# Patient Record
Sex: FEMALE | Race: WHITE | NOT HISPANIC OR LATINO
[De-identification: names, ages, dates, MRNs, and addresses within clinical notes are randomized per-mention and may not be internally consistent; named-entity substitution may affect disease eponyms.]

---

## 2021-04-08 ENCOUNTER — NON-APPOINTMENT (OUTPATIENT)
Age: 76
End: 2021-04-08

## 2021-04-23 DIAGNOSIS — R07.9 CHEST PAIN, UNSPECIFIED: ICD-10-CM

## 2021-04-29 DIAGNOSIS — I10 ESSENTIAL (PRIMARY) HYPERTENSION: ICD-10-CM

## 2021-05-06 ENCOUNTER — APPOINTMENT (OUTPATIENT)
Dept: HEART AND VASCULAR | Facility: CLINIC | Age: 76
End: 2021-05-06

## 2021-09-30 ENCOUNTER — APPOINTMENT (OUTPATIENT)
Dept: HEART AND VASCULAR | Facility: CLINIC | Age: 76
End: 2021-09-30

## 2021-11-05 ENCOUNTER — APPOINTMENT (OUTPATIENT)
Dept: HEART AND VASCULAR | Facility: CLINIC | Age: 76
End: 2021-11-05

## 2021-11-12 DIAGNOSIS — R09.89 OTHER SPECIFIED SYMPTOMS AND SIGNS INVOLVING THE CIRCULATORY AND RESPIRATORY SYSTEMS: ICD-10-CM

## 2021-11-12 DIAGNOSIS — I25.9 CHRONIC ISCHEMIC HEART DISEASE, UNSPECIFIED: ICD-10-CM

## 2021-11-15 ENCOUNTER — APPOINTMENT (OUTPATIENT)
Dept: HEART AND VASCULAR | Facility: CLINIC | Age: 76
End: 2021-11-15

## 2022-05-20 ENCOUNTER — INPATIENT (INPATIENT)
Facility: HOSPITAL | Age: 77
LOS: 0 days | Discharge: HOME | DRG: 305 | End: 2022-05-21
Attending: INTERNAL MEDICINE | Admitting: INTERNAL MEDICINE
Payer: MEDICARE

## 2022-05-20 VITALS
RESPIRATION RATE: 16 BRPM | WEIGHT: 139.99 LBS | OXYGEN SATURATION: 96 % | HEIGHT: 63 IN | TEMPERATURE: 98 F | HEART RATE: 70 BPM | DIASTOLIC BLOOD PRESSURE: 95 MMHG | SYSTOLIC BLOOD PRESSURE: 166 MMHG

## 2022-05-20 DIAGNOSIS — Z98.89 OTHER SPECIFIED POSTPROCEDURAL STATES: Chronic | ICD-10-CM

## 2022-05-20 DIAGNOSIS — R73.9 HYPERGLYCEMIA, UNSPECIFIED: ICD-10-CM

## 2022-05-20 DIAGNOSIS — E03.9 HYPOTHYROIDISM, UNSPECIFIED: ICD-10-CM

## 2022-05-20 DIAGNOSIS — K37 UNSPECIFIED APPENDICITIS: Chronic | ICD-10-CM

## 2022-05-20 DIAGNOSIS — Z86.2 PERSONAL HISTORY OF DISEASES OF THE BLOOD AND BLOOD-FORMING ORGANS AND CERTAIN DISORDERS INVOLVING THE IMMUNE MECHANISM: ICD-10-CM

## 2022-05-20 DIAGNOSIS — R06.00 DYSPNEA, UNSPECIFIED: ICD-10-CM

## 2022-05-20 DIAGNOSIS — I10 ESSENTIAL (PRIMARY) HYPERTENSION: ICD-10-CM

## 2022-05-20 DIAGNOSIS — D72.829 ELEVATED WHITE BLOOD CELL COUNT, UNSPECIFIED: ICD-10-CM

## 2022-05-20 LAB
ALBUMIN SERPL ELPH-MCNC: 4.1 G/DL — SIGNIFICANT CHANGE UP (ref 3.3–5)
ALP SERPL-CCNC: 132 U/L — HIGH (ref 40–120)
ALT FLD-CCNC: 41 U/L — SIGNIFICANT CHANGE UP (ref 10–45)
ANION GAP SERPL CALC-SCNC: 12 MMOL/L — SIGNIFICANT CHANGE UP (ref 5–17)
APPEARANCE UR: CLEAR — SIGNIFICANT CHANGE UP
APTT BLD: 24.5 SEC — LOW (ref 27.5–35.5)
AST SERPL-CCNC: 23 U/L — SIGNIFICANT CHANGE UP (ref 10–40)
BASOPHILS # BLD AUTO: 0 K/UL — SIGNIFICANT CHANGE UP (ref 0–0.2)
BASOPHILS NFR BLD AUTO: 0 % — SIGNIFICANT CHANGE UP (ref 0–2)
BILIRUB SERPL-MCNC: 0.6 MG/DL — SIGNIFICANT CHANGE UP (ref 0.2–1.2)
BILIRUB UR-MCNC: NEGATIVE — SIGNIFICANT CHANGE UP
BUN SERPL-MCNC: 24 MG/DL — HIGH (ref 7–23)
CALCIUM SERPL-MCNC: 9.8 MG/DL — SIGNIFICANT CHANGE UP (ref 8.4–10.5)
CHLORIDE SERPL-SCNC: 103 MMOL/L — SIGNIFICANT CHANGE UP (ref 96–108)
CO2 SERPL-SCNC: 25 MMOL/L — SIGNIFICANT CHANGE UP (ref 22–31)
COLOR SPEC: YELLOW — SIGNIFICANT CHANGE UP
CREAT SERPL-MCNC: 0.83 MG/DL — SIGNIFICANT CHANGE UP (ref 0.5–1.3)
DIFF PNL FLD: NEGATIVE — SIGNIFICANT CHANGE UP
EGFR: 73 ML/MIN/1.73M2 — SIGNIFICANT CHANGE UP
EOSINOPHIL # BLD AUTO: 0 K/UL — SIGNIFICANT CHANGE UP (ref 0–0.5)
EOSINOPHIL NFR BLD AUTO: 0 % — SIGNIFICANT CHANGE UP (ref 0–6)
GLUCOSE BLDC GLUCOMTR-MCNC: 289 MG/DL — HIGH (ref 70–99)
GLUCOSE SERPL-MCNC: 320 MG/DL — HIGH (ref 70–99)
GLUCOSE UR QL: >=1000
HCT VFR BLD CALC: 42.6 % — SIGNIFICANT CHANGE UP (ref 34.5–45)
HGB BLD-MCNC: 14.3 G/DL — SIGNIFICANT CHANGE UP (ref 11.5–15.5)
INR BLD: 0.9 — SIGNIFICANT CHANGE UP (ref 0.88–1.16)
KETONES UR-MCNC: 15 MG/DL
LEUKOCYTE ESTERASE UR-ACNC: ABNORMAL
LYMPHOCYTES # BLD AUTO: 17 % — SIGNIFICANT CHANGE UP (ref 13–44)
LYMPHOCYTES # BLD AUTO: 4.21 K/UL — HIGH (ref 1–3.3)
MCHC RBC-ENTMCNC: 31.8 PG — SIGNIFICANT CHANGE UP (ref 27–34)
MCHC RBC-ENTMCNC: 33.6 GM/DL — SIGNIFICANT CHANGE UP (ref 32–36)
MCV RBC AUTO: 94.9 FL — SIGNIFICANT CHANGE UP (ref 80–100)
MONOCYTES # BLD AUTO: 0.45 K/UL — SIGNIFICANT CHANGE UP (ref 0–0.9)
MONOCYTES NFR BLD AUTO: 1.8 % — LOW (ref 2–14)
NEUTROPHILS # BLD AUTO: 20.09 K/UL — HIGH (ref 1.8–7.4)
NEUTROPHILS NFR BLD AUTO: 81.2 % — HIGH (ref 43–77)
NITRITE UR-MCNC: NEGATIVE — SIGNIFICANT CHANGE UP
PH UR: 6 — SIGNIFICANT CHANGE UP (ref 5–8)
PLATELET # BLD AUTO: 280 K/UL — SIGNIFICANT CHANGE UP (ref 150–400)
POTASSIUM SERPL-MCNC: 5.3 MMOL/L — SIGNIFICANT CHANGE UP (ref 3.5–5.3)
POTASSIUM SERPL-SCNC: 5.3 MMOL/L — SIGNIFICANT CHANGE UP (ref 3.5–5.3)
PROT SERPL-MCNC: 6.6 G/DL — SIGNIFICANT CHANGE UP (ref 6–8.3)
PROT UR-MCNC: ABNORMAL MG/DL
PROTHROM AB SERPL-ACNC: 10.7 SEC — SIGNIFICANT CHANGE UP (ref 10.5–13.4)
RBC # BLD: 4.49 M/UL — SIGNIFICANT CHANGE UP (ref 3.8–5.2)
RBC # FLD: 13.8 % — SIGNIFICANT CHANGE UP (ref 10.3–14.5)
SARS-COV-2 RNA SPEC QL NAA+PROBE: NEGATIVE — SIGNIFICANT CHANGE UP
SODIUM SERPL-SCNC: 140 MMOL/L — SIGNIFICANT CHANGE UP (ref 135–145)
SP GR SPEC: >=1.03 — SIGNIFICANT CHANGE UP (ref 1–1.03)
TROPONIN T SERPL-MCNC: 0.01 NG/ML — SIGNIFICANT CHANGE UP (ref 0–0.01)
UROBILINOGEN FLD QL: 0.2 E.U./DL — SIGNIFICANT CHANGE UP
WBC # BLD: 24.74 K/UL — HIGH (ref 3.8–10.5)
WBC # FLD AUTO: 24.74 K/UL — HIGH (ref 3.8–10.5)

## 2022-05-20 PROCEDURE — 93010 ELECTROCARDIOGRAM REPORT: CPT

## 2022-05-20 PROCEDURE — 99285 EMERGENCY DEPT VISIT HI MDM: CPT

## 2022-05-20 PROCEDURE — 71045 X-RAY EXAM CHEST 1 VIEW: CPT | Mod: 26

## 2022-05-20 RX ORDER — SODIUM CHLORIDE 9 MG/ML
1000 INJECTION INTRAMUSCULAR; INTRAVENOUS; SUBCUTANEOUS
Refills: 0 | Status: DISCONTINUED | OUTPATIENT
Start: 2022-05-20 | End: 2022-05-20

## 2022-05-20 RX ORDER — METOPROLOL TARTRATE 50 MG
25 TABLET ORAL DAILY
Refills: 0 | Status: DISCONTINUED | OUTPATIENT
Start: 2022-05-21 | End: 2022-05-21

## 2022-05-20 RX ORDER — VERAPAMIL HCL 240 MG
180 CAPSULE, EXTENDED RELEASE PELLETS 24 HR ORAL EVERY 24 HOURS
Refills: 0 | Status: DISCONTINUED | OUTPATIENT
Start: 2022-05-20 | End: 2022-05-21

## 2022-05-20 RX ORDER — VERAPAMIL HCL 240 MG
180 CAPSULE, EXTENDED RELEASE PELLETS 24 HR ORAL DAILY
Refills: 0 | Status: DISCONTINUED | OUTPATIENT
Start: 2022-05-20 | End: 2022-05-20

## 2022-05-20 RX ORDER — HEPARIN SODIUM 5000 [USP'U]/ML
5000 INJECTION INTRAVENOUS; SUBCUTANEOUS EVERY 12 HOURS
Refills: 0 | Status: DISCONTINUED | OUTPATIENT
Start: 2022-05-20 | End: 2022-05-21

## 2022-05-20 RX ORDER — ATORVASTATIN CALCIUM 80 MG/1
80 TABLET, FILM COATED ORAL AT BEDTIME
Refills: 0 | Status: DISCONTINUED | OUTPATIENT
Start: 2022-05-20 | End: 2022-05-21

## 2022-05-20 RX ORDER — ASPIRIN/CALCIUM CARB/MAGNESIUM 324 MG
81 TABLET ORAL DAILY
Refills: 0 | Status: DISCONTINUED | OUTPATIENT
Start: 2022-05-20 | End: 2022-05-21

## 2022-05-20 RX ORDER — INSULIN HUMAN 100 [IU]/ML
5 INJECTION, SOLUTION SUBCUTANEOUS ONCE
Refills: 0 | Status: COMPLETED | OUTPATIENT
Start: 2022-05-20 | End: 2022-05-20

## 2022-05-20 RX ADMIN — ATORVASTATIN CALCIUM 80 MILLIGRAM(S): 80 TABLET, FILM COATED ORAL at 22:57

## 2022-05-20 RX ADMIN — SODIUM CHLORIDE 100 MILLILITER(S): 9 INJECTION INTRAMUSCULAR; INTRAVENOUS; SUBCUTANEOUS at 19:47

## 2022-05-20 RX ADMIN — HEPARIN SODIUM 5000 UNIT(S): 5000 INJECTION INTRAVENOUS; SUBCUTANEOUS at 22:56

## 2022-05-20 NOTE — H&P ADULT - PROBLEM SELECTOR PLAN 3
--WBC 24 on admit.   --UA (-); AFebrile; Lung CTA; No infectious symptoms at this time.   --Could be 2/2 daily Methylprednisolone.   --Continue to monitor.

## 2022-05-20 NOTE — ED ADULT NURSE NOTE - NSIMPLEMENTINTERV_GEN_ALL_ED
Implemented All Universal Safety Interventions:  [place] to call system. Call bell, personal items and telephone within reach. Instruct patient to call for assistance. Room bathroom lighting operational. Non-slip footwear when patient is off stretcher. Physically safe environment: no spills, clutter or unnecessary equipment. Stretcher in lowest position, wheels locked, appropriate side rails in place.

## 2022-05-20 NOTE — ED PROVIDER NOTE - OBJECTIVE STATEMENT
76 y/o F with a pmhx of HTN, CAD, S/P stents x3 2016, HLD , HT, presents to the ED c/o BROWN starting today. Reports she was barely able to cross the street when the BROWN had started. Denies any CP, dizziness or numbness. Pt was seen by her cardiologist Dr. Marshall Hsieh and found to be hypertensive with a diastolic of 116 bp. Pt refereed to ED for hypertensive urgency and to be admitted for BP control. 78 y/o F with a pmhx of HTN, CAD, S/P stents x3 in 2016, HLD , arthritis on methylprednisone, presents to the ED c/o BROWN starting yesterday. Reports she was barely able to cross the street when dyspnea began. Denies any CP, dizziness, palpitations, syncope. Pt was seen by her cardiologist Dr. Marshall Hsieh today and found to be hypertensive with a diastolic of 116 mm hg. Pt referred to ED for hypertensive urgency.

## 2022-05-20 NOTE — ED ADULT NURSE NOTE - CHPI ED NUR SYMPTOMS NEG
no back pain/no chest pain/no congestion/no diaphoresis/no dizziness/no fever/no nausea/no shortness of breath/no syncope/no vomiting

## 2022-05-20 NOTE — H&P ADULT - HISTORY OF PRESENT ILLNESS
Pt is 76yo Female w/ PMHx of HTN, CAD (s/p PCI x3 in 2016), HT______ who presented to [facility] ED on 5/20/22 sent from cardiologist office (Dr. Hsieh) for hypertensive urgency. In office, patient w/ dyspnea on exertion and was found to have a Diastolic BP of 115-120 (unknown was SBP reading was). Pt referred to ED at that time for BP medication regimen optimization. At this time patient comfortable. Pt denies CP, SOB at rest, abdominal pain, N/V, palpitations, dizziness/syncope, LE edema, orthopnea, PND.     VITALS: HR 60-70s, BP 160s/90s, SpO2 96% on RA, RR 16, T 98.4F. LABS: WBC 24.74, Hgb/Hct 14.3/42.6, Plt Cnt 280, Trop T 0.01, , Blood Glucose 320.     TREATMENT IN ED: Humulin 5units SQ x1, NS 1000cc IV bolus.     Patient admitted to cardiology for further optimization of blood pressure medication regimen.  Pt is 76yo Female w/ PMHx of HTN, CAD (s/p PCI mLAD and PCI D1 in 2016), hypothyroid (was previously on synthroid, no longer taking), autoimmune disorder ?? (pt is on methylprednisolone and followed w/ rheumatology; unsure what disease she has) who presented to [facility] ED on 5/20/22 sent from cardiologist office (Dr. Hsieh) for hypertensive urgency. In office, patient w/ dyspnea on exertion and was found to have a Diastolic BP of 115-120 (unknown was SBP reading was). Pt referred to ED at that time for BP medication regimen optimization. Pt reporting SEVERE reduction in exercise tolerance, typically walks miles; however, over the past 4 days she gets very short of breath with ambulation of ~1 city block. Patient states this is the aAt this time patient comfortable. Pt denies CP, SOB at rest, abdominal pain, N/V, palpitations, dizziness/syncope, LE edema, orthopnea, PND.     VITALS: HR 60-70s, BP 160s/90s, SpO2 96% on RA, RR 16, T 98.4F. LABS: WBC 24.74, Hgb/Hct 14.3/42.6, Plt Cnt 280, Trop T 0.01, , Blood Glucose 320.     TREATMENT IN ED: Humulin 5units SQ x1, NS 1000cc IV bolus.     Patient admitted to cardiology for further optimization of blood pressure medication regimen.

## 2022-05-20 NOTE — H&P ADULT - NSICDXPASTMEDICALHX_GEN_ALL_CORE_FT
PAST MEDICAL HISTORY:  Coronary artery disease involving native coronary artery of native heart without angina pectoris     HTN (hypertension)     Hyperlipidemia     Hypothyroid     Stented coronary artery

## 2022-05-20 NOTE — H&P ADULT - NSHPLABSRESULTS_GEN_ALL_CORE
14.3   24.74 )-----------( 280      ( 20 May 2022 18:04 )             42.6     140  |  103  |  24<H>  ----------------------------<  320<H>  5.3   |  25  |  0.83    Ca    9.8      20 May 2022 18:04    TPro  6.6  /  Alb  4.1  /  TBili  0.6  /  DBili  x   /  AST  23  /  ALT  41  /  AlkPhos  132<H>  05-20    PT/INR - ( 20 May 2022 18:04 )   PT: 10.7 sec;   INR: 0.90        PTT - ( 20 May 2022 18:04 )  PTT:24.5 sec    CARDIAC MARKERS ( 20 May 2022 18:04 )  x     / 0.01 ng/mL / x     / x     / x        Urinalysis Basic - ( 20 May 2022 18:21 )    Color: Yellow / Appearance: Clear / SG: >=1.030 / pH: x  Gluc: x / Ketone: 15 mg/dL  / Bili: Negative / Urobili: 0.2 E.U./dL   Blood: x / Protein: Trace mg/dL / Nitrite: NEGATIVE   Leuk Esterase: Trace / RBC: < 5 /HPF / WBC > 10 /HPF   Sq Epi: x / Non Sq Epi: Many /HPF / Bacteria: Present /HPF

## 2022-05-20 NOTE — H&P ADULT - PROBLEM SELECTOR PLAN 1
--Sent in by Dr. Hsieh for HTN (-120 in office; unknown SBP).   --On arrival BP 160s/90s.   --CONT: Verapamil 180mg PO QD, Toprol 25mg PO QD.   --PLAN: monitor pressure overnight, and expand/alter BP regimen as clinically indicated.

## 2022-05-20 NOTE — ED ADULT TRIAGE NOTE - CHIEF COMPLAINT QUOTE
Pt has hx of 3 cardiac stents, reports BROWN x 4 days. Seen by cardiologist today and had ECHO, found to be hypertensive at 155/115 in office. Sent to ED for abnormal ECHO showing "wall motion abnormalities," and for HTN urgency. Denies headache, dizziness, chest pain, sob at this time.

## 2022-05-20 NOTE — H&P ADULT - PROBLEM SELECTOR PLAN 5
-- on admission.   --Pt denies history of diabetes.   --Daily Methylprednisolone use may contribute.   --Pt refused insulin at this time given she has never taken insulin and denies Hx of diabetes.   --F/U A1c in morning labs and initiate insulin if pt diabetic.

## 2022-05-20 NOTE — H&P ADULT - PROBLEM SELECTOR PLAN 6
--Pt on Methylprednisolone 4mg PO QD, and is unsure why (states she has been on this for 10yrs).   --Follows w/ Dr. Alexander Feliciano (rheumatologist) - attempt to obtain collateral on patient's autoimmune disorder history.         DVT ppx: Hep SQ  Dispo: pending further eval

## 2022-05-20 NOTE — H&P ADULT - CONSTITUTIONAL
detailed exam
I have re-evaluated the patient's fluid status and reviewed vital signs. Clinical perfusion assessment was performed.

## 2022-05-20 NOTE — ED ADULT NURSE NOTE - OBJECTIVE STATEMENT
Pt was sent to ED for abnormal ECHO showing "wall motion abnormalities," and for HTN urgency. AOX4. VSS.  Patient denies chest pain, discomfort, shortness of breath, difficulty breathing and any form of distress not noted. Patient oriented to ED area. All needs attended. Pt on cardiac monitor. Rounding in progress. Fall risk precautions maintained. Pt was sent to ED for abnormal ECHO showing "wall motion abnormalities," and for HTN urgency. AOX4. VSS.  Patient denies chest pain, discomfort, shortness of breath, difficulty breathing and any form of distress not noted at the moment. States: "I only feel the symptoms when I walk or trying to do something". Patient oriented to ED area. All needs attended. Pt on cardiac monitor. Rounding in progress. Fall risk precautions maintained.

## 2022-05-20 NOTE — H&P ADULT - PROBLEM SELECTOR PLAN 2
--Pt w/ significant BROWN w/ ambulation of < 1 block x4 days (reports she is typically a long distance walker).   --Pt reports symptoms are similar to her previous cardiac cath w/ PCI in 2016.   --Cardiac Cath (3/2016): PCI mLAD, PCI D1.   --Denies any current symptoms; Denies any episodes of CP/dizziness/syncope over the past few months.   --PLAN: TTE ordered for structural eval; consider ischemic eval this admit given presentation similar to previous admission.

## 2022-05-20 NOTE — H&P ADULT - NSICDXPASTSURGICALHX_GEN_ALL_CORE_FT
PAST SURGICAL HISTORY:  Appendicitis When pt was 26 yo    History of cardiac cath 3/3/16@[facility]: 85% mid LAD FFR 0.76; mid D1 diffuse 90%; LCx luminal irregularities; RCA dominant with luminal irregularities; EF 55% EDP = 15mmHg. Successful Rotoblator assisted PTCA Promus ARIES placement of mid LAD and D1

## 2022-05-20 NOTE — ED PROVIDER NOTE - CLINICAL SUMMARY MEDICAL DECISION MAKING FREE TEXT BOX
Impression: Pt with a hx of HTN, HLD, CAD, s/p stents refereed to ED for BROWN, uncontrolled hypertension. BP stable 166/95, vitals otherwise stable in ED. Labs noted to have leukocytosis 24.74 secondary to chronic methylprednisolone usage, trop negative, remainder of CMP pending. Will contact Dr. Hsieh to discuss case. CXR clear with no fluids, no infiltrates, no CHF. Will admit to cardiac tele. Impression: Pt with a hx of HTN, HLD, CAD, s/p stents referred to ED for BROWN and uncontrolled hypertension. BP mildly elev at 166/95, vitals otherwise stable in ED. Labs notable for leukocytosis 24.74 and hyperglycemia to 300 with neg ag, likely secondary to chronic methylprednisolone usage. trop and bnp neg. CXR neg for effusions, infiltrates, CHF. Will admit to cardiac tele for further management of bp and work up for brown.

## 2022-05-20 NOTE — ED PROVIDER NOTE - NSICDXPASTSURGICALHX_GEN_ALL_CORE_FT
PAST SURGICAL HISTORY:  Appendicitis When pt was 26 yo    History of cardiac cath 3/3/16@[facility]: 85% mid LAD FFR 0.76; mid D1 diffuse 90%; LCx luminal irregularities; RCA dominant with luminal irregularities; EF 55% EDP = 15mmHg. Successful Rotoblator assisted PTCA Promus ARIES placement of mid LAD and D1

## 2022-05-20 NOTE — H&P ADULT - PROBLEM SELECTOR PLAN 4
--Hx of Hypothyroid, and per documentation patient previously on Levothyroxine 50mg PO QD.   --Pt not on Levothyroxine at this time.   --F/U TSH on morning labs.

## 2022-05-20 NOTE — H&P ADULT - ASSESSMENT
76yo F w/ PMHx of HTN, HLD, CAD (s/p PCI mLAD and PCI D1 in 2016), hypothyroid, possible autoimmune disorder (follows w/ rheumatologist and takes steroids daily) who was sent in from cardiologist office for HTN and BROWN. Pt BP 160s/90s on arrival (reporting -120). Pt sent for BP regimen optimization. Pt also w/ significant BROWN x4days (usually walks miles, now dyspneic w/ ambulation of < 1 city block). Reports these symptoms are similar to what she experienced prior to her PCI in 2016.

## 2022-05-20 NOTE — PATIENT PROFILE ADULT - FALL HARM RISK - HARM RISK INTERVENTIONS
Assistance with ambulation/Assistance OOB with selected safe patient handling equipment/Communicate Risk of Fall with Harm to all staff/Monitor gait and stability/Reinforce activity limits and safety measures with patient and family/Review medications for side effects contributing to fall risk/Sit up slowly, dangle for a short time, stand at bedside before walking/Tailored Fall Risk Interventions/Toileting schedule using arm’s reach rule for commode and bathroom/Use of alarms - bed, chair and/or voice tab/Visual Cue: Yellow wristband and red socks/Bed in lowest position, wheels locked, appropriate side rails in place/Call bell, personal items and telephone in reach/Instruct patient to call for assistance before getting out of bed or chair/Non-slip footwear when patient is out of bed/[place] to call system/Physically safe environment - no spills, clutter or unnecessary equipment/Purposeful Proactive Rounding/Room/bathroom lighting operational, light cord in reach

## 2022-05-21 ENCOUNTER — TRANSCRIPTION ENCOUNTER (OUTPATIENT)
Age: 77
End: 2022-05-21

## 2022-05-21 VITALS
HEART RATE: 64 BPM | DIASTOLIC BLOOD PRESSURE: 76 MMHG | SYSTOLIC BLOOD PRESSURE: 148 MMHG | RESPIRATION RATE: 17 BRPM | OXYGEN SATURATION: 95 %

## 2022-05-21 LAB
A1C WITH ESTIMATED AVERAGE GLUCOSE RESULT: 9.5 % — HIGH (ref 4–5.6)
ANION GAP SERPL CALC-SCNC: 8 MMOL/L — SIGNIFICANT CHANGE UP (ref 5–17)
BUN SERPL-MCNC: 22 MG/DL — SIGNIFICANT CHANGE UP (ref 7–23)
CALCIUM SERPL-MCNC: 9.2 MG/DL — SIGNIFICANT CHANGE UP (ref 8.4–10.5)
CHLORIDE SERPL-SCNC: 106 MMOL/L — SIGNIFICANT CHANGE UP (ref 96–108)
CHOLEST SERPL-MCNC: 173 MG/DL — SIGNIFICANT CHANGE UP
CO2 SERPL-SCNC: 24 MMOL/L — SIGNIFICANT CHANGE UP (ref 22–31)
CREAT SERPL-MCNC: 0.75 MG/DL — SIGNIFICANT CHANGE UP (ref 0.5–1.3)
EGFR: 83 ML/MIN/1.73M2 — SIGNIFICANT CHANGE UP
ESTIMATED AVERAGE GLUCOSE: 226 MG/DL — HIGH (ref 68–114)
GLUCOSE SERPL-MCNC: 277 MG/DL — HIGH (ref 70–99)
HCT VFR BLD CALC: 39.3 % — SIGNIFICANT CHANGE UP (ref 34.5–45)
HCV AB S/CO SERPL IA: 0.04 S/CO — SIGNIFICANT CHANGE UP
HCV AB SERPL-IMP: SIGNIFICANT CHANGE UP
HDLC SERPL-MCNC: 43 MG/DL — LOW
HGB BLD-MCNC: 13.2 G/DL — SIGNIFICANT CHANGE UP (ref 11.5–15.5)
LIPID PNL WITH DIRECT LDL SERPL: 86 MG/DL — SIGNIFICANT CHANGE UP
MAGNESIUM SERPL-MCNC: 2 MG/DL — SIGNIFICANT CHANGE UP (ref 1.6–2.6)
MCHC RBC-ENTMCNC: 32 PG — SIGNIFICANT CHANGE UP (ref 27–34)
MCHC RBC-ENTMCNC: 33.6 GM/DL — SIGNIFICANT CHANGE UP (ref 32–36)
MCV RBC AUTO: 95.2 FL — SIGNIFICANT CHANGE UP (ref 80–100)
NON HDL CHOLESTEROL: 130 MG/DL — HIGH
NRBC # BLD: 0 /100 WBCS — SIGNIFICANT CHANGE UP (ref 0–0)
PLATELET # BLD AUTO: 236 K/UL — SIGNIFICANT CHANGE UP (ref 150–400)
POTASSIUM SERPL-MCNC: 4.2 MMOL/L — SIGNIFICANT CHANGE UP (ref 3.5–5.3)
POTASSIUM SERPL-SCNC: 4.2 MMOL/L — SIGNIFICANT CHANGE UP (ref 3.5–5.3)
RBC # BLD: 4.13 M/UL — SIGNIFICANT CHANGE UP (ref 3.8–5.2)
RBC # FLD: 13.9 % — SIGNIFICANT CHANGE UP (ref 10.3–14.5)
SODIUM SERPL-SCNC: 138 MMOL/L — SIGNIFICANT CHANGE UP (ref 135–145)
TRIGL SERPL-MCNC: 221 MG/DL — HIGH
TROPONIN T SERPL-MCNC: <0.01 NG/ML — SIGNIFICANT CHANGE UP (ref 0–0.01)
TSH SERPL-MCNC: 1.3 UIU/ML — SIGNIFICANT CHANGE UP (ref 0.27–4.2)
WBC # BLD: 20.23 K/UL — HIGH (ref 3.8–10.5)
WBC # FLD AUTO: 20.23 K/UL — HIGH (ref 3.8–10.5)

## 2022-05-21 PROCEDURE — 80053 COMPREHEN METABOLIC PANEL: CPT

## 2022-05-21 PROCEDURE — 80061 LIPID PANEL: CPT

## 2022-05-21 PROCEDURE — 85730 THROMBOPLASTIN TIME PARTIAL: CPT

## 2022-05-21 PROCEDURE — 85027 COMPLETE CBC AUTOMATED: CPT

## 2022-05-21 PROCEDURE — 85610 PROTHROMBIN TIME: CPT

## 2022-05-21 PROCEDURE — 84443 ASSAY THYROID STIM HORMONE: CPT

## 2022-05-21 PROCEDURE — 84484 ASSAY OF TROPONIN QUANT: CPT

## 2022-05-21 PROCEDURE — 93005 ELECTROCARDIOGRAM TRACING: CPT

## 2022-05-21 PROCEDURE — 86803 HEPATITIS C AB TEST: CPT

## 2022-05-21 PROCEDURE — 82962 GLUCOSE BLOOD TEST: CPT

## 2022-05-21 PROCEDURE — 85025 COMPLETE CBC W/AUTO DIFF WBC: CPT

## 2022-05-21 PROCEDURE — 80048 BASIC METABOLIC PNL TOTAL CA: CPT

## 2022-05-21 PROCEDURE — 83735 ASSAY OF MAGNESIUM: CPT

## 2022-05-21 PROCEDURE — 36415 COLL VENOUS BLD VENIPUNCTURE: CPT

## 2022-05-21 PROCEDURE — 83036 HEMOGLOBIN GLYCOSYLATED A1C: CPT

## 2022-05-21 PROCEDURE — 99285 EMERGENCY DEPT VISIT HI MDM: CPT

## 2022-05-21 PROCEDURE — 87086 URINE CULTURE/COLONY COUNT: CPT

## 2022-05-21 PROCEDURE — 71045 X-RAY EXAM CHEST 1 VIEW: CPT

## 2022-05-21 PROCEDURE — 83880 ASSAY OF NATRIURETIC PEPTIDE: CPT

## 2022-05-21 PROCEDURE — 81001 URINALYSIS AUTO W/SCOPE: CPT

## 2022-05-21 PROCEDURE — 87635 SARS-COV-2 COVID-19 AMP PRB: CPT

## 2022-05-21 RX ORDER — ISOSORBIDE MONONITRATE 60 MG/1
60 TABLET, EXTENDED RELEASE ORAL DAILY
Refills: 0 | Status: DISCONTINUED | OUTPATIENT
Start: 2022-05-21 | End: 2022-05-21

## 2022-05-21 RX ORDER — ASPIRIN/CALCIUM CARB/MAGNESIUM 324 MG
1 TABLET ORAL
Qty: 30 | Refills: 3
Start: 2022-05-21 | End: 2022-09-17

## 2022-05-21 RX ORDER — ISOSORBIDE MONONITRATE 60 MG/1
1 TABLET, EXTENDED RELEASE ORAL
Qty: 30 | Refills: 3
Start: 2022-05-21 | End: 2022-09-17

## 2022-05-21 RX ORDER — LACTOBACILLUS ACIDOPHILUS 100MM CELL
1 CAPSULE ORAL DAILY
Refills: 0 | Status: DISCONTINUED | OUTPATIENT
Start: 2022-05-21 | End: 2022-05-21

## 2022-05-21 RX ADMIN — Medication 4 MILLIGRAM(S): at 05:40

## 2022-05-21 RX ADMIN — Medication 81 MILLIGRAM(S): at 11:49

## 2022-05-21 RX ADMIN — ISOSORBIDE MONONITRATE 60 MILLIGRAM(S): 60 TABLET, EXTENDED RELEASE ORAL at 11:49

## 2022-05-21 RX ADMIN — Medication 1 TABLET(S): at 14:09

## 2022-05-21 RX ADMIN — HEPARIN SODIUM 5000 UNIT(S): 5000 INJECTION INTRAVENOUS; SUBCUTANEOUS at 05:40

## 2022-05-21 RX ADMIN — Medication 25 MILLIGRAM(S): at 05:38

## 2022-05-21 RX ADMIN — Medication 180 MILLIGRAM(S): at 05:38

## 2022-05-21 NOTE — DISCHARGE NOTE PROVIDER - NSDCMRMEDTOKEN_GEN_ALL_CORE_FT
Lipitor 80 mg oral tablet: 1 tab(s) orally once a day  methylPREDNISolone 4 mg oral tablet: 1 tab(s) orally once a day  Toprol-XL 25 mg oral tablet, extended release: 1 tab(s) orally once a day  Trintellix 5 mg oral tablet: 1 tab(s) orally once a day  verapamil 180 mg/24 hours oral capsule, extended release: 1 cap(s) orally once a day   aspirin 81 mg oral delayed release tablet: 1 tab(s) orally once a day  isosorbide mononitrate 60 mg oral tablet, extended release: 1 tab(s) orally once a day  Lipitor 80 mg oral tablet: 1 tab(s) orally once a day  methylPREDNISolone 4 mg oral tablet: 1 tab(s) orally once a day  sulfamethoxazole-trimethoprim 800 mg-160 mg oral tablet: 1 tab(s) orally 2 times a day. Final dose should be evening of 5/23  Toprol-XL 25 mg oral tablet, extended release: 1 tab(s) orally once a day  Trintellix 5 mg oral tablet: 1 tab(s) orally once a day  verapamil 180 mg/24 hours oral capsule, extended release: 1 cap(s) orally once a day

## 2022-05-21 NOTE — DISCHARGE NOTE PROVIDER - CARE PROVIDER_API CALL
Marshall Hsieh (MD)  Cardiovascular Disease; Interventional Cardiology  [address] [facility], [address] [place] [address]  Phone: [phone]  Fax: [phone]  Follow Up Time: 2 weeks

## 2022-05-21 NOTE — DISCHARGE NOTE NURSING/CASE MANAGEMENT/SOCIAL WORK - NSDCPEFALRISK_GEN_ALL_CORE
For information on Fall & Injury Prevention, visit: [email or web address] OR  [email or web address] OR  https://www.cdc.gov/steadi/patient.html

## 2022-05-21 NOTE — DISCHARGE NOTE NURSING/CASE MANAGEMENT/SOCIAL WORK - PATIENT PORTAL LINK FT
You can access the FollowMyHealth Patient Portal offered by [facility] by registering at the following website: [email or web address]. By joining [facility]’s FollowMyHealth portal, you will also be able to view your health information using other applications (apps) compatible with our system.

## 2022-05-21 NOTE — DISCHARGE NOTE PROVIDER - HOSPITAL COURSE
Pt is 76yo Female w/ PMHx of HTN, CAD (s/p PCI mLAD and PCI D1 in 2016), hypothyroidism (previously on synthroid, no longer taking), polymyalgia rheumatica (on daily methylprednisolone) who was referred to [facility] ED on 5/20/22 from her cardiologist's office (Dr. Hsieh) for hypertensive urgency. In office, patient w/ dyspnea on exertion and diastolic BP of 115-120 (SBP reading unknown). At the time of presentation, pt reported SEVERE reduction in exercise tolerance x 4 days and BROWN when ambulating ~1 city block. BP was 178/88. CXR w/ no acute abnormalities.  EKG NSR w/ no ischemic changes. Troponins negative x 2. . In the ED, she was hypderglycemic with serum glucose of 320. Treated in ED with Humulin 5units SQ x1, NS 1000cc IV bolus. Decision made to admit to cardiac tele for further monitoring, workup, and blood pressure optimization.     BP improved ranging from -140s. Isosorbide 60mg added to blood pressure regimen. Patient with notable leukocytosis (WBC 20.23). Leukocytosis possibly in setting of methylprednisolone use. UA did appear mildly positive with +LE, WBC, and bacteria. Patient reported sx of possible dysuria. She was started on Bactrim DS 160mg-800mg BID for a total of 3 days for presumed UTI. Course to be completed on 5/23.      Patient was monitored for a total of 24 hours without any complaints or events overnight. She was seen and examined at bedside day of discharge. VSS, no significant events on telemetry, physical exam benign. Hospital course reviewed with attending cardiologist Dr. Hsieh and patient deemed stable for discharge home. New prescriptions sent to patient's preferred pharmacy. Discharge instructions reviewed with patient. Patient to follow up with Dr. Hsieh within 1-2 weeks of discharge home from the hospital.      VITALS: HR 60-70s, BP 160s/90s, SpO2 96% on RA, RR 16, T 98.4F. LABS: WBC 24.74, Hgb/Hct 14.3/42.6, Plt Cnt 280, Trop T 0.01, , Blood Glucose 320.     TREATMENT IN ED: .     Patient admitted to cardiology for further optimization of blood pressure medication regimen Pt is 76yo Female w/ PMHx of HTN, CAD (s/p PCI mLAD and PCI D1 in 2016), hypothyroidism (previously on synthroid, no longer taking), polymyalgia rheumatica (on daily methylprednisolone) who was referred to [facility] ED on 5/20/22 from her cardiologist's office (Dr. Hsieh) for hypertensive urgency. In office, patient w/ dyspnea on exertion and diastolic BP of 115-120 (SBP reading unknown). At the time of presentation, pt reported SEVERE reduction in exercise tolerance x 4 days and BROWN when ambulating ~1 city block. BP was 178/88. CXR w/ no acute abnormalities.  EKG NSR w/ no ischemic changes. Troponins negative x 2. . In the ED, she was hypderglycemic with serum glucose of 320. Treated in ED with Humulin 5units SQ x1, NS 1000cc IV bolus. Decision made to admit to cardiac tele for further monitoring, workup, and blood pressure optimization.     Isosorbide 60mg added to blood pressure regimen. Patient with notable leukocytosis (WBC 24-20). Leukocytosis possibly in setting of methylprednisolone use. UA did appear mildly positive with +LE, WBC, and bacteria. Patient reported sx of possible dysuria. She was started on Bactrim DS 160mg-800mg BID for a total of 3 days for presumed UTI. Course to be completed on 5/23.      Patient was monitored for a total of 24 hours without any complaints or events overnight. She was seen and examined at bedside day of discharge. VSS, no significant events on telemetry, physical exam benign. Hospital course reviewed with attending cardiologist Dr. Hsieh and patient deemed stable for discharge home. New prescriptions sent to patient's preferred pharmacy. Discharge instructions reviewed with patient. Patient to follow up with Dr. Hsieh within 1-2 weeks of discharge home from the hospital.   Pt is 76yo Female w/ PMHx of HTN, CAD (s/p PCI mLAD and PCI D1 in 2016), hypothyroidism (previously on synthroid, no longer taking), polymyalgia rheumatica (on daily methylprednisolone) who was referred to [facility] ED on 5/20/22 from her cardiologist's office (Dr. Hsieh) for hypertensive urgency. In office, patient w/ dyspnea on exertion and diastolic BP of 115-120 (SBP reading unknown). At the time of presentation, pt reported SEVERE reduction in exercise tolerance x 4 days and BROWN when ambulating ~1 city block. BP was 178/88. CXR w/ no acute abnormalities.  EKG NSR w/ no ischemic changes. Troponins negative x 2. . In the ED, she was hypderglycemic with serum glucose of 320. Treated in ED with Humulin 5units SQ x1, NS 1000cc IV bolus. Decision made to admit to cardiac tele for further monitoring, workup, and blood pressure optimization.     Isosorbide 60mg added to blood pressure regimen. Patient with notable leukocytosis (WBC 24-20). Leukocytosis possibly in setting of methylprednisolone use. UA mildly positive with +LE, WBC, and bacteria w/ sx of dysuria. She was started on Bactrim DS 160mg-800mg BID for a total of 3 days for UTI. Course to be completed on 5/23.     Patient was monitored for a total of 24 hours without any complaints or events overnight. She was seen and examined at bedside day of discharge. VSS, no significant events on telemetry, physical exam benign. Hospital course reviewed with attending cardiologist Dr. Hsieh and patient deemed stable for discharge home. New prescriptions sent to patient's preferred pharmacy. Discharge instructions reviewed with patient. Patient to follow up with Dr. Hsieh within 1-2 weeks of discharge home from the hospital. Will be scheduled for a right and left catherization in the outpatient setting. Patient additionally instructed to follow up with PCP to be evaluated for DMII given A1C of 9.5.

## 2022-05-21 NOTE — DISCHARGE NOTE PROVIDER - NSDCCPCAREPLAN_GEN_ALL_CORE_FT
PRINCIPAL DISCHARGE DIAGNOSIS  Diagnosis: Hypertension  Assessment and Plan of Treatment: - Your blood pressure was dangerously high when you presented to the hospital and may have been causing some of your symptoms. You were started on a new blood pressure medication: ISOSORBIDE 60MG. Please pick this medication up at the pharmacy and begin taking daily. Please continue taking your Metorprolol XL 25mg and Verapamil 180mg.   Please continue taking your medications as listed to keep your blood pressure controlled. In addition, there are multiple lifestyle modifications that have been proven to lower blood pressure: maintaining a healthy body weight, engaging in regular physical activity for at least 30 minutes per day on most days, and consuming a diet rich in fruits, vegetables, and low-fat dairy products with a reduced amount of total and saturated fats and sodium.  For blood pressures at home that are too high or low please see your Doctor or go to the Emergency Room as necessary. Please follow up with Dr. Hsieh when discharged.        SECONDARY DISCHARGE DIAGNOSES  Diagnosis: BROWN (dyspnea on exertion)  Assessment and Plan of Treatment: You will need further work up. Please be scheduled for a right and left heart catherization. Please follow up with Dr. Hsieh for scheduling.    Diagnosis: Acute UTI  Assessment and Plan of Treatment: You were found to have bacteria in your urine. You were prescribed Bactrim DS 160mg-800mg twice a day for 3 days. You received your first dose in the hospital and should complete your last dose on 5/23. It is important to complete the entire course of antibiotics to prevent the bacteria from ascending further up your urinary tract system. Please make sure to take this medication with food.    Diagnosis: Hyperglycemia  Assessment and Plan of Treatment: You were found to have an elevated glucose level.  Your Hemoglobin A1c, which measures your average blood sugar level over the last three months, is 9.5 which is abnormal. Please initiate a diet low in sugar, exercise. You should follow up with your PCP to discuss treatment for possible Diabetes Type II.       Diagnosis: H/O autoimmune disorder  Assessment and Plan of Treatment: Your methylprednisone could be contributing to your elevated glucose levels. Please discuss this possibility with your PCP and rheumatologist.

## 2022-05-23 DIAGNOSIS — M35.3 POLYMYALGIA RHEUMATICA: ICD-10-CM

## 2022-05-23 DIAGNOSIS — D72.829 ELEVATED WHITE BLOOD CELL COUNT, UNSPECIFIED: ICD-10-CM

## 2022-05-23 DIAGNOSIS — T38.0X5A ADVERSE EFFECT OF GLUCOCORTICOIDS AND SYNTHETIC ANALOGUES, INITIAL ENCOUNTER: ICD-10-CM

## 2022-05-23 DIAGNOSIS — I16.0 HYPERTENSIVE URGENCY: ICD-10-CM
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
## 2022-05-23 DIAGNOSIS — N39.0 URINARY TRACT INFECTION, SITE NOT SPECIFIED: ICD-10-CM

## 2022-05-23 DIAGNOSIS — I25.10 ATHEROSCLEROTIC HEART DISEASE OF NATIVE CORONARY ARTERY WITHOUT ANGINA PECTORIS: ICD-10-CM

## 2022-05-23 DIAGNOSIS — E03.9 HYPOTHYROIDISM, UNSPECIFIED: ICD-10-CM

## 2022-05-23 DIAGNOSIS — Z95.5 PRESENCE OF CORONARY ANGIOPLASTY IMPLANT AND GRAFT: ICD-10-CM

## 2022-05-23 DIAGNOSIS — I10 ESSENTIAL (PRIMARY) HYPERTENSION: ICD-10-CM

## 2022-05-23 DIAGNOSIS — R73.9 HYPERGLYCEMIA, UNSPECIFIED: ICD-10-CM

## 2022-06-14 VITALS
RESPIRATION RATE: 16 BRPM | OXYGEN SATURATION: 98 % | SYSTOLIC BLOOD PRESSURE: 148 MMHG | HEIGHT: 63 IN | HEART RATE: 86 BPM | DIASTOLIC BLOOD PRESSURE: 92 MMHG | WEIGHT: 139.99 LBS

## 2022-06-14 RX ORDER — CHLORHEXIDINE GLUCONATE 213 G/1000ML
1 SOLUTION TOPICAL ONCE
Refills: 0 | Status: DISCONTINUED | OUTPATIENT
Start: 2022-06-20 | End: 2022-06-21

## 2022-06-14 NOTE — H&P ADULT - HISTORY OF PRESENT ILLNESS
Cardiologist: Dr. Hsieh   COVID:   Pharmacy:   Escort:     78 y/o female with PMhx of HTN, CAD (s/p PCI mLAD and PCI D1 in 2016), hypothyroidism (previously on synthroid, no longer taking), polymyalgia rheumatica (on daily methylprednisolone), and DM (A1c 9.5%, 5/2022), recently admitted at [facility] from 5/20-5/21/22 for hypertensive urgency, was recommended for cardiac catheterization for further evaluation post-hospitalization. Patient recently had a TTE (5/20/22) which revealed mild to moderate LVH, EF 40%. Grade I diastolic dysfunction. Basal anterolateral, basal inferolateral, mid inferolateral and apical inferior hypokinesis. mild MR, mild TR. Compared to TTE from 11/2019, basal inferolateral, mid inferolateral, and apical inferior hypokinesis is new.     In light of pt's risk factors, known CAD, and abnormal TTE, pt presented for right and left cardiac catheterization for further evaluation.    Cardiologist: Dr. Hsieh   COVID:   Pharmacy:   Escort:     * Verify meds *    78 y/o female with PMhx of HTN, CAD (s/p PCI mLAD and PCI D1 in 2016), hypothyroidism (previously on synthroid, no longer taking), polymyalgia rheumatica (on daily methylprednisolone), and DM (A1c 9.5%, 5/2022), recently admitted at [facility] from 5/20-5/21/22 for hypertensive urgency and BROWN, was medically managed and was recommended for cardiac catheterization for further evaluation of BROWN post-hospitalization. Patient recently had a TTE (5/20/22) which revealed mild to moderate LVH, EF 40%. Grade I diastolic dysfunction. Basal anterolateral, basal inferolateral, mid inferolateral and apical inferior hypokinesis, mild MR, mild TR. Compared to TTE from 11/2019, basal inferolateral, mid inferolateral, and apical inferior wall hypokinesis is new.     In light of pt's risk factors, known CAD, abnormal TTE, and ongoing BROWN, pt presented for right and left cardiac catheterization for further evaluation.       Cardiac cath (3/3/16 @[facility]): 85% mid LAD FFR 0.76; mid D1 diffuse 90%; LCx luminal irregularities; RCA dominant with luminal irregularities; EF 55% EDP = 15mmHg. Successful Rotoblator assisted PTCA Promus ARIES placement of mid LAD and D1. Cardiologist: Dr. Hsieh   COVID:   Pharmacy:   Escort:     [facility]     * Verify meds *    76 y/o female with PMhx of HTN, CAD (s/p PCI mLAD and PCI D1 in 2016), hypothyroidism (previously on synthroid, no longer taking), polymyalgia rheumatica (on daily methylprednisolone), and DM (A1c 9.5%, 5/2022), recently admitted at [facility] from 5/20-5/21/22 for hypertensive urgency and BROWN, was medically managed and was recommended for cardiac catheterization for further evaluation of BROWN post-hospitalization. Patient recently had a TTE (5/20/22) which revealed mild to moderate LVH, EF 40%. Grade I diastolic dysfunction. Basal anterolateral, basal inferolateral, mid inferolateral and apical inferior hypokinesis, mild MR, mild TR. Compared to TTE from 11/2019, basal inferolateral, mid inferolateral, and apical inferior wall hypokinesis is new.     In light of pt's risk factors, known CAD, abnormal TTE, and ongoing BROWN, pt presented for right and left cardiac catheterization for further evaluation.       Cardiac cath (3/3/16 @[facility]): 85% mid LAD FFR 0.76; mid D1 diffuse 90%; LCx luminal irregularities; RCA dominant with luminal irregularities; EF 55% EDP = 15mmHg. Successful Rotoblator assisted PTCA Promus ARIES placement of mid LAD and D1. Cardiologist: Dr. Мария ENRIQUEZ negative (printed in chart)  Pharmacy:   Escort:     History obtained via outpatient Cardiologist note    78 y/o F, PMHx HTN, CAD (s/p PCI mLAD and PCI D1 in 2016), hypothyroidism (previously on synthroid, no longer taking), polymyalgia rheumatica (on daily methylprednisolone), DM (A1c 9.5%, 5/2022), recently admitted at [facility] from 5/20-5/21/22 for hypertensive urgency and BROWN. Reports SEVERE reduction in exercise tolerance, typically walks miles; however, now with BROWN on ambulation of ~1 city block.  TTE (5/20/22): mild to moderate LVH, EF 40%, grade I diastolic dysfunction, basal anterolateral, basal inferolateral, mid inferolateral and apical inferior hypokinesis, mild MR, mild TR; compared to TTE from 11/2019, basal inferolateral, mid inferolateral, and apical inferior wall hypokinesis is new. In light of pt's risk factors, known CAD, abnormal TTE, newly reduced EF, CCS Anginal Equivalent Class III Sx, pt referred for right and left cardiac catheterization with possible intervention.       Cardiac cath (3/3/16 @[facility]): 85% mid LAD FFR 0.76; mid D1 diffuse 90%; LCx luminal irregularities; RCA dominant with luminal irregularities; EF 55% EDP = 15mmHg. Successful Rotoblator assisted PTCA Promus ARIES placement of mid LAD and D1. Cardiologist: Dr. Мария ENRIQUEZ negative 6/17/22 (printed in chart)  Pharmacy:  [facility] [id number] [place] [address], medications verified by pt's detailed list and cross-referenced by pt's report (reliable)/surescripts  Escort:  NONE    History obtained via outpatient Cardiologist note    76 y/o F, PMHx HTN, CAD (s/p PCI mLAD and PCI D1 in 2016), hypothyroidism (previously on synthroid, no longer taking), polymyalgia rheumatica (on daily methylprednisolone), DM (A1c 9.5%, 5/2022), recently admitted at [facility] from 5/20-5/21/22 for hypertensive urgency and BROWN. Reports SEVERE reduction in exercise tolerance, typically walks miles; however, now with BROWN on ambulation of ~1 city block.  TTE (5/20/22): mild to moderate LVH, EF 40%, grade I diastolic dysfunction, basal anterolateral, basal inferolateral, mid inferolateral and apical inferior hypokinesis, mild MR, mild TR; compared to TTE from 11/2019, basal inferolateral, mid inferolateral, and apical inferior wall hypokinesis is new. In light of pt's risk factors, known CAD, abnormal TTE, newly reduced EF, CCS Anginal Equivalent Class III Sx, pt referred for right and left cardiac catheterization with possible intervention.       Cardiac cath (3/3/16 @[facility]): 85% mid LAD FFR 0.76; mid D1 diffuse 90%; LCx luminal irregularities; RCA dominant with luminal irregularities; EF 55% EDP = 15mmHg. Successful Rotoblator assisted PTCA Promus ARIES placement of mid LAD and D1.

## 2022-06-14 NOTE — H&P ADULT - ASSESSMENT
78 y/o F, PMHx HTN, CAD (s/p PCI mLAD and PCI D1 in 2016), hypothyroidism (previously on synthroid, no longer taking), polymyalgia rheumatica (on daily methylprednisolone), DM (A1c 9.5%, 5/2022), recently admitted at [facility] from 5/20-5/21/22 for hypertensive urgency and BROWN. Reports SEVERE reduction in exercise tolerance, typically walks miles; however, now with BROWN on ambulation of ~1 city block.  In light of pt's risk factors, known CAD, abnormal TTE, newly reduced EF, CCS Anginal Equivalent Class III Sx, pt referred for right and left cardiac catheterization with possible intervention.       ASA Class III    Mallampati Class III    Risks & benefits of procedure and alternative therapy have been explained to the patient including but not limited to: allergic reaction, bleeding with possible need for blood transfusion, infection, renal and vascular compromise, limb damage, arrhythmia, stroke, vessel dissection/perforation, Myocardial infarction, emergent CABG. Informed consent obtained and in chart.       Patient a candidate for sedation: Yes    Sedation Type: Moderate 76 y/o F, PMHx HTN, CAD (s/p PCI mLAD and PCI D1 in 2016), hypothyroidism (previously on synthroid, no longer taking), polymyalgia rheumatica (on daily methylprednisolone), DM (A1c 9.5%, 5/2022), recently admitted at [facility] from 5/20-5/21/22 for hypertensive urgency and BROWN. Reports SEVERE reduction in exercise tolerance, typically walks miles; however, now with BROWN on ambulation of ~1 city block.  In light of pt's risk factors, known CAD, abnormal TTE, newly reduced EF, CCS Anginal Equivalent Class III Sx, pt referred for right and left cardiac catheterization with possible intervention.       ASA Class III    Mallampati Class III    Per pt report -  she broke her L ankle in 3 places, no surgery recommended but still swollen & ecchymotic. Recommended pt follow up with ortho provider.     Does not take Ecotrin 2/2 bruising. Educated patient on importance of compliance with ASA/Plavix if stent placed. Pt verbalized understanding. Will load with Ecotrin 325 mg PO x1 & Plavix 600 mg PO x1 pre-cath.     EF 40% by ECHO. Appears dry on exam. Will provide IV  cc bolus x1 pre-cath per hydration protocol.     Risks & benefits of procedure and alternative therapy have been explained to the patient including but not limited to: allergic reaction, bleeding with possible need for blood transfusion, infection, renal and vascular compromise, limb damage, arrhythmia, stroke, vessel dissection/perforation, Myocardial infarction, emergent CABG. Informed consent obtained and in chart.       Patient a candidate for sedation: Yes    Sedation Type: Moderate 76 y/o F, PMHx HTN, CAD (s/p PCI mLAD and PCI D1 in 2016), hypothyroidism (previously on synthroid, no longer taking), polymyalgia rheumatica (on daily methylprednisolone), DM (A1c 9.5%, 5/2022), recently admitted at [facility] from 5/20-5/21/22 for hypertensive urgency and BROWN. Reports SEVERE reduction in exercise tolerance, typically walks miles; however, now with BROWN on ambulation of ~1 city block.  In light of pt's risk factors, known CAD, abnormal TTE, newly reduced EF, CCS Anginal Equivalent Class III Sx, pt referred for right and left cardiac catheterization with possible intervention.       ASA Class III    Mallampati Class III    Per pt report -  she broke her L ankle in 3 places, no surgery recommended but still swollen & ecchymotic. Recommended pt follow up with ortho provider.     Does not take Ecotrin 2/2 bruising. Educated patient on importance of compliance with ASA/Plavix if stent placed. Pt verbalized understanding. Will load with Ecotrin 325 mg PO x1 & Plavix 600 mg PO x1 pre-cath.     EF 40% by ECHO. Appears dry on exam. Will provide IV  cc bolus x1 pre-cath per hydration protocol.     WBC 27 but on methylprednisolone for polymyalgias rheumatica.      Risks & benefits of procedure and alternative therapy have been explained to the patient including but not limited to: allergic reaction, bleeding with possible need for blood transfusion, infection, renal and vascular compromise, limb damage, arrhythmia, stroke, vessel dissection/perforation, Myocardial infarction, emergent CABG. Informed consent obtained and in chart.       Patient a candidate for sedation: Yes    Sedation Type: Moderate 78 y/o F, PMHx HTN, CAD (s/p PCI mLAD and PCI D1 in 2016), hypothyroidism (previously on synthroid, no longer taking), polymyalgia rheumatica (on daily methylprednisolone), DM (A1c 9.5%, 5/2022), recently admitted at [facility] from 5/20-5/21/22 for hypertensive urgency and BROWN. Reports SEVERE reduction in exercise tolerance, typically walks miles; however, now with BROWN on ambulation of ~1 city block.  In light of pt's risk factors, known CAD, abnormal TTE, newly reduced EF, CCS Anginal Equivalent Class III Sx, pt referred for right and left cardiac catheterization with possible intervention.       ASA Class III    Mallampati Class III    Per pt report -  she broke her L ankle in 3 places, no surgery recommended but still swollen & ecchymotic. Recommended pt follow up with ortho provider.     Does not take Ecotrin 2/2 bruising. Educated patient on importance of compliance with ASA/Plavix if stent placed. Pt verbalized understanding. Will load with Ecotrin 325 mg PO x1 & Plavix 600 mg PO x1 pre-cath.     EF 40% by ECHO. Appears dry on exam. Will provide IV  cc bolus x1 pre-cath per hydration protocol.     HgbA1C 10%. Does not take anything for diabetes. ENdo consult if patient stays.     Hypokalemia 3.7. Will replete with KCl 40 mEq x1.     WBC 27 but on methylprednisolone for polymyalgias rheumatica.      Risks & benefits of procedure and alternative therapy have been explained to the patient including but not limited to: allergic reaction, bleeding with possible need for blood transfusion, infection, renal and vascular compromise, limb damage, arrhythmia, stroke, vessel dissection/perforation, Myocardial infarction, emergent CABG. Informed consent obtained and in chart.       Patient a candidate for sedation: Yes    Sedation Type: Moderate 78 y/o F, PMHx HTN, CAD (s/p PCI mLAD and PCI D1 in 2016), hypothyroidism (previously on synthroid, no longer taking), polymyalgia rheumatica (on daily methylprednisolone), DM (A1c 9.5%, 5/2022), recently admitted at [facility] from 5/20-5/21/22 for hypertensive urgency and BROWN. Reports SEVERE reduction in exercise tolerance, typically walks miles; however, now with BROWN on ambulation of ~1 city block.  In light of pt's risk factors, known CAD, abnormal TTE, newly reduced EF, CCS Anginal Equivalent Class III Sx, pt referred for right and left cardiac catheterization with possible intervention.       ASA Class III    Mallampati Class III    Per pt report -  she broke her L ankle in 3 places, no surgery recommended but still swollen & ecchymotic. Recommended pt follow up with ortho provider.     Does not take Ecotrin 2/2 bruising. Educated patient on importance of compliance with ASA/Plavix if stent placed. Pt verbalized understanding. Will load with Ecotrin 325 mg PO x1 & Plavix 600 mg PO x1 pre-cath.     EF 40% by ECHO. Appears dry on exam. Will provide IV  cc bolus x1 pre-cath per hydration protocol.     HgbA1C 10%. Does not take anything for diabetes. Advised patient that she had diabetes. Endo consult if patient stays for which she is agreeable.     Hypokalemia 3.7. Will replete with KCl 40 mEq x1.     WBC 27 but on methylprednisolone for polymyalgias rheumatica.      Risks & benefits of procedure and alternative therapy have been explained to the patient including but not limited to: allergic reaction, bleeding with possible need for blood transfusion, infection, renal and vascular compromise, limb damage, arrhythmia, stroke, vessel dissection/perforation, Myocardial infarction, emergent CABG. Informed consent obtained and in chart.       Patient a candidate for sedation: Yes    Sedation Type: Moderate

## 2022-06-14 NOTE — H&P ADULT - NSICDXPASTSURGICALHX_GEN_ALL_CORE_FT
PAST SURGICAL HISTORY:  Appendicitis When pt was 28 yo    History of cardiac cath 3/3/16@[facility]: 85% mid LAD FFR 0.76; mid D1 diffuse 90%; LCx luminal irregularities; RCA dominant with luminal irregularities; EF 55% EDP = 15mmHg. Successful Rotoblator assisted PTCA Promus ARIES placement of mid LAD and D1     PAST SURGICAL HISTORY:  Appendicitis when pt was 28 yo    History of cardiac cath 3/3/16@[facility]: 85% mid Left anterior descending artery FFR 0.76; mid D1 diffuse 90%; LCx luminal irregularities; RCA dominant with luminal irregularities; EF 55% EDP = 15mmHg. Successful Rotoblator assisted PTCA Promus ARIES placement of mid Left anterior descending artery and D1.

## 2022-06-14 NOTE — H&P ADULT - NSHPLABSRESULTS_GEN_ALL_CORE
EKG: EKG:NSR @ 80 BPM with LAD, LAFB, no ST/T segment changes                          14.1   27.31 )-----------( 330      ( 20 Jun 2022 09:45 )             43.9         PT/INR - ( 20 Jun 2022 09:45 )   PT: 10.7 sec;   INR: 0.90          PTT - ( 20 Jun 2022 09:45 )  PTT:23.5 sec

## 2022-06-20 ENCOUNTER — TRANSCRIPTION ENCOUNTER (OUTPATIENT)
Age: 77
End: 2022-06-20

## 2022-06-20 ENCOUNTER — INPATIENT (INPATIENT)
Facility: HOSPITAL | Age: 77
LOS: 0 days | Discharge: HOME | DRG: 287 | End: 2022-06-21
Attending: HOSPITALIST | Admitting: HOSPITALIST
Payer: COMMERCIAL

## 2022-06-20 DIAGNOSIS — K37 UNSPECIFIED APPENDICITIS: Chronic | ICD-10-CM

## 2022-06-20 DIAGNOSIS — Z98.89 OTHER SPECIFIED POSTPROCEDURAL STATES: Chronic | ICD-10-CM

## 2022-06-20 DIAGNOSIS — E11.9 TYPE 2 DIABETES MELLITUS WITHOUT COMPLICATIONS: ICD-10-CM

## 2022-06-20 LAB
A1C WITH ESTIMATED AVERAGE GLUCOSE RESULT: 10.5 % — HIGH (ref 4–5.6)
ALBUMIN SERPL ELPH-MCNC: 4.3 G/DL — SIGNIFICANT CHANGE UP (ref 3.3–5)
ALP SERPL-CCNC: 154 U/L — HIGH (ref 40–120)
ALT FLD-CCNC: 27 U/L — SIGNIFICANT CHANGE UP (ref 10–45)
ANION GAP SERPL CALC-SCNC: 16 MMOL/L — SIGNIFICANT CHANGE UP (ref 5–17)
ANISOCYTOSIS BLD QL: SLIGHT — SIGNIFICANT CHANGE UP
APTT BLD: 23.5 SEC — LOW (ref 27.5–35.5)
AST SERPL-CCNC: 20 U/L — SIGNIFICANT CHANGE UP (ref 10–40)
BASOPHILS # BLD AUTO: 0.27 K/UL — HIGH (ref 0–0.2)
BASOPHILS NFR BLD AUTO: 1 % — SIGNIFICANT CHANGE UP (ref 0–2)
BILIRUB SERPL-MCNC: 0.6 MG/DL — SIGNIFICANT CHANGE UP (ref 0.2–1.2)
BUN SERPL-MCNC: 23 MG/DL — SIGNIFICANT CHANGE UP (ref 7–23)
CALCIUM SERPL-MCNC: 9.3 MG/DL — SIGNIFICANT CHANGE UP (ref 8.4–10.5)
CHLORIDE SERPL-SCNC: 103 MMOL/L — SIGNIFICANT CHANGE UP (ref 96–108)
CHOLEST SERPL-MCNC: 218 MG/DL — HIGH
CK MB CFR SERPL CALC: 2.8 NG/ML — SIGNIFICANT CHANGE UP (ref 0–6.7)
CK SERPL-CCNC: 33 U/L — SIGNIFICANT CHANGE UP (ref 25–170)
CO2 SERPL-SCNC: 20 MMOL/L — LOW (ref 22–31)
CREAT SERPL-MCNC: 0.87 MG/DL — SIGNIFICANT CHANGE UP (ref 0.5–1.3)
EGFR: 69 ML/MIN/1.73M2 — SIGNIFICANT CHANGE UP
EOSINOPHIL # BLD AUTO: 0 K/UL — SIGNIFICANT CHANGE UP (ref 0–0.5)
EOSINOPHIL NFR BLD AUTO: 0 % — SIGNIFICANT CHANGE UP (ref 0–6)
ESTIMATED AVERAGE GLUCOSE: 255 MG/DL — HIGH (ref 68–114)
GLUCOSE BLDC GLUCOMTR-MCNC: 155 MG/DL — HIGH (ref 70–99)
GLUCOSE BLDC GLUCOMTR-MCNC: 215 MG/DL — HIGH (ref 70–99)
GLUCOSE BLDC GLUCOMTR-MCNC: 295 MG/DL — HIGH (ref 70–99)
GLUCOSE SERPL-MCNC: 297 MG/DL — HIGH (ref 70–99)
HCT VFR BLD CALC: 43.9 % — SIGNIFICANT CHANGE UP (ref 34.5–45)
HDLC SERPL-MCNC: 50 MG/DL — LOW
HGB BLD-MCNC: 14.1 G/DL — SIGNIFICANT CHANGE UP (ref 11.5–15.5)
HYPOCHROMIA BLD QL: SLIGHT — SIGNIFICANT CHANGE UP
INR BLD: 0.9 — SIGNIFICANT CHANGE UP (ref 0.88–1.16)
LIPID PNL WITH DIRECT LDL SERPL: 92 MG/DL — SIGNIFICANT CHANGE UP
LYMPHOCYTES # BLD AUTO: 20.2 % — SIGNIFICANT CHANGE UP (ref 13–44)
LYMPHOCYTES # BLD AUTO: 5.52 K/UL — HIGH (ref 1–3.3)
MANUAL SMEAR VERIFICATION: SIGNIFICANT CHANGE UP
MCHC RBC-ENTMCNC: 32 PG — SIGNIFICANT CHANGE UP (ref 27–34)
MCHC RBC-ENTMCNC: 32.1 GM/DL — SIGNIFICANT CHANGE UP (ref 32–36)
MCV RBC AUTO: 99.5 FL — SIGNIFICANT CHANGE UP (ref 80–100)
METAMYELOCYTES # FLD: 1.9 % — HIGH (ref 0–0)
MICROCYTES BLD QL: SLIGHT — SIGNIFICANT CHANGE UP
MONOCYTES # BLD AUTO: 0.52 K/UL — SIGNIFICANT CHANGE UP (ref 0–0.9)
MONOCYTES NFR BLD AUTO: 1.9 % — LOW (ref 2–14)
NEUTROPHILS # BLD AUTO: 20.48 K/UL — HIGH (ref 1.8–7.4)
NEUTROPHILS NFR BLD AUTO: 75 % — SIGNIFICANT CHANGE UP (ref 43–77)
NON HDL CHOLESTEROL: 168 MG/DL — HIGH
OVALOCYTES BLD QL SMEAR: SLIGHT — SIGNIFICANT CHANGE UP
PLAT MORPH BLD: ABNORMAL
PLATELET # BLD AUTO: 330 K/UL — SIGNIFICANT CHANGE UP (ref 150–400)
POIKILOCYTOSIS BLD QL AUTO: SLIGHT — SIGNIFICANT CHANGE UP
POLYCHROMASIA BLD QL SMEAR: SLIGHT — SIGNIFICANT CHANGE UP
POTASSIUM SERPL-MCNC: 3.7 MMOL/L — SIGNIFICANT CHANGE UP (ref 3.5–5.3)
POTASSIUM SERPL-SCNC: 3.7 MMOL/L — SIGNIFICANT CHANGE UP (ref 3.5–5.3)
PROT SERPL-MCNC: 6.8 G/DL — SIGNIFICANT CHANGE UP (ref 6–8.3)
PROTHROM AB SERPL-ACNC: 10.7 SEC — SIGNIFICANT CHANGE UP (ref 10.5–13.4)
RBC # BLD: 4.41 M/UL — SIGNIFICANT CHANGE UP (ref 3.8–5.2)
RBC # FLD: 14.3 % — SIGNIFICANT CHANGE UP (ref 10.3–14.5)
RBC BLD AUTO: ABNORMAL
SMUDGE CELLS # BLD: PRESENT — SIGNIFICANT CHANGE UP
SODIUM SERPL-SCNC: 139 MMOL/L — SIGNIFICANT CHANGE UP (ref 135–145)
SPHEROCYTES BLD QL SMEAR: SLIGHT — SIGNIFICANT CHANGE UP
TRIGL SERPL-MCNC: 382 MG/DL — HIGH
WBC # BLD: 27.31 K/UL — HIGH (ref 3.8–10.5)
WBC # FLD AUTO: 27.31 K/UL — HIGH (ref 3.8–10.5)

## 2022-06-20 PROCEDURE — 99222 1ST HOSP IP/OBS MODERATE 55: CPT

## 2022-06-20 PROCEDURE — 93010 ELECTROCARDIOGRAM REPORT: CPT

## 2022-06-20 PROCEDURE — 99223 1ST HOSP IP/OBS HIGH 75: CPT

## 2022-06-20 RX ORDER — METOPROLOL TARTRATE 50 MG
1 TABLET ORAL
Qty: 0 | Refills: 0 | DISCHARGE

## 2022-06-20 RX ORDER — SODIUM CHLORIDE 9 MG/ML
1000 INJECTION, SOLUTION INTRAVENOUS
Refills: 0 | Status: DISCONTINUED | OUTPATIENT
Start: 2022-06-20 | End: 2022-06-21

## 2022-06-20 RX ORDER — ATORVASTATIN CALCIUM 80 MG/1
80 TABLET, FILM COATED ORAL AT BEDTIME
Refills: 0 | Status: DISCONTINUED | OUTPATIENT
Start: 2022-06-20 | End: 2022-06-21

## 2022-06-20 RX ORDER — GLUCAGON INJECTION, SOLUTION 0.5 MG/.1ML
1 INJECTION, SOLUTION SUBCUTANEOUS ONCE
Refills: 0 | Status: DISCONTINUED | OUTPATIENT
Start: 2022-06-20 | End: 2022-06-21

## 2022-06-20 RX ORDER — DEXTROSE 50 % IN WATER 50 %
15 SYRINGE (ML) INTRAVENOUS ONCE
Refills: 0 | Status: DISCONTINUED | OUTPATIENT
Start: 2022-06-20 | End: 2022-06-21

## 2022-06-20 RX ORDER — CLOPIDOGREL BISULFATE 75 MG/1
600 TABLET, FILM COATED ORAL ONCE
Refills: 0 | Status: COMPLETED | OUTPATIENT
Start: 2022-06-20 | End: 2022-06-20

## 2022-06-20 RX ORDER — ASPIRIN/CALCIUM CARB/MAGNESIUM 324 MG
325 TABLET ORAL ONCE
Refills: 0 | Status: COMPLETED | OUTPATIENT
Start: 2022-06-20 | End: 2022-06-20

## 2022-06-20 RX ORDER — ASPIRIN/CALCIUM CARB/MAGNESIUM 324 MG
81 TABLET ORAL DAILY
Refills: 0 | Status: DISCONTINUED | OUTPATIENT
Start: 2022-06-21 | End: 2022-06-21

## 2022-06-20 RX ORDER — INSULIN LISPRO 100/ML
VIAL (ML) SUBCUTANEOUS
Refills: 0 | Status: DISCONTINUED | OUTPATIENT
Start: 2022-06-20 | End: 2022-06-21

## 2022-06-20 RX ORDER — SODIUM CHLORIDE 9 MG/ML
250 INJECTION INTRAMUSCULAR; INTRAVENOUS; SUBCUTANEOUS ONCE
Refills: 0 | Status: COMPLETED | OUTPATIENT
Start: 2022-06-20 | End: 2022-06-20

## 2022-06-20 RX ORDER — INSULIN GLARGINE 100 [IU]/ML
12 INJECTION, SOLUTION SUBCUTANEOUS AT BEDTIME
Refills: 0 | Status: DISCONTINUED | OUTPATIENT
Start: 2022-06-20 | End: 2022-06-21

## 2022-06-20 RX ORDER — ISOPROPYL ALCOHOL, BENZOCAINE .7; .06 ML/ML; ML/ML
1 SWAB TOPICAL
Qty: 100 | Refills: 1
Start: 2022-06-20 | End: 2022-08-08

## 2022-06-20 RX ORDER — DEXTROSE 50 % IN WATER 50 %
25 SYRINGE (ML) INTRAVENOUS ONCE
Refills: 0 | Status: DISCONTINUED | OUTPATIENT
Start: 2022-06-20 | End: 2022-06-21

## 2022-06-20 RX ORDER — AMITRIPTYLINE HCL 25 MG
1 TABLET ORAL
Qty: 0 | Refills: 0 | DISCHARGE

## 2022-06-20 RX ORDER — VORTIOXETINE 5 MG/1
1 TABLET, FILM COATED ORAL
Qty: 0 | Refills: 0 | DISCHARGE

## 2022-06-20 RX ORDER — AMITRIPTYLINE HCL 25 MG
25 TABLET ORAL AT BEDTIME
Refills: 0 | Status: DISCONTINUED | OUTPATIENT
Start: 2022-06-20 | End: 2022-06-21

## 2022-06-20 RX ORDER — VERAPAMIL HCL 240 MG
1 CAPSULE, EXTENDED RELEASE PELLETS 24 HR ORAL
Qty: 0 | Refills: 0 | DISCHARGE

## 2022-06-20 RX ORDER — ISOSORBIDE MONONITRATE 60 MG/1
1 TABLET, EXTENDED RELEASE ORAL
Qty: 0 | Refills: 0 | DISCHARGE

## 2022-06-20 RX ORDER — INSULIN LISPRO 100/ML
4 VIAL (ML) SUBCUTANEOUS
Refills: 0 | Status: DISCONTINUED | OUTPATIENT
Start: 2022-06-20 | End: 2022-06-21

## 2022-06-20 RX ORDER — LANOLIN ALCOHOL/MO/W.PET/CERES
3 CREAM (GRAM) TOPICAL ONCE
Refills: 0 | Status: COMPLETED | OUTPATIENT
Start: 2022-06-20 | End: 2022-06-20

## 2022-06-20 RX ORDER — INFLUENZA VIRUS VACCINE 15; 15; 15; 15 UG/.5ML; UG/.5ML; UG/.5ML; UG/.5ML
0.7 SUSPENSION INTRAMUSCULAR ONCE
Refills: 0 | Status: DISCONTINUED | OUTPATIENT
Start: 2022-06-20 | End: 2022-06-21

## 2022-06-20 RX ORDER — POTASSIUM CHLORIDE 20 MEQ
40 PACKET (EA) ORAL ONCE
Refills: 0 | Status: COMPLETED | OUTPATIENT
Start: 2022-06-20 | End: 2022-06-20

## 2022-06-20 RX ORDER — DEXTROSE 50 % IN WATER 50 %
12.5 SYRINGE (ML) INTRAVENOUS ONCE
Refills: 0 | Status: DISCONTINUED | OUTPATIENT
Start: 2022-06-20 | End: 2022-06-21

## 2022-06-20 RX ORDER — ACETAMINOPHEN 500 MG
650 TABLET ORAL EVERY 6 HOURS
Refills: 0 | Status: DISCONTINUED | OUTPATIENT
Start: 2022-06-20 | End: 2022-06-21

## 2022-06-20 RX ORDER — VERAPAMIL HCL 240 MG
120 CAPSULE, EXTENDED RELEASE PELLETS 24 HR ORAL DAILY
Refills: 0 | Status: DISCONTINUED | OUTPATIENT
Start: 2022-06-21 | End: 2022-06-21

## 2022-06-20 RX ORDER — SODIUM CHLORIDE 9 MG/ML
500 INJECTION INTRAMUSCULAR; INTRAVENOUS; SUBCUTANEOUS
Refills: 0 | Status: DISCONTINUED | OUTPATIENT
Start: 2022-06-20 | End: 2022-06-21

## 2022-06-20 RX ORDER — ISOSORBIDE MONONITRATE 60 MG/1
30 TABLET, EXTENDED RELEASE ORAL DAILY
Refills: 0 | Status: DISCONTINUED | OUTPATIENT
Start: 2022-06-20 | End: 2022-06-21

## 2022-06-20 RX ORDER — METOPROLOL TARTRATE 50 MG
25 TABLET ORAL DAILY
Refills: 0 | Status: DISCONTINUED | OUTPATIENT
Start: 2022-06-20 | End: 2022-06-21

## 2022-06-20 RX ADMIN — Medication 4 MILLIGRAM(S): at 18:19

## 2022-06-20 RX ADMIN — Medication 325 MILLIGRAM(S): at 10:59

## 2022-06-20 RX ADMIN — Medication 6: at 22:43

## 2022-06-20 RX ADMIN — Medication 25 MILLIGRAM(S): at 23:07

## 2022-06-20 RX ADMIN — SODIUM CHLORIDE 500 MILLILITER(S): 9 INJECTION INTRAMUSCULAR; INTRAVENOUS; SUBCUTANEOUS at 11:04

## 2022-06-20 RX ADMIN — Medication 25 MILLIGRAM(S): at 18:19

## 2022-06-20 RX ADMIN — INSULIN GLARGINE 12 UNIT(S): 100 INJECTION, SOLUTION SUBCUTANEOUS at 22:43

## 2022-06-20 RX ADMIN — CLOPIDOGREL BISULFATE 600 MILLIGRAM(S): 75 TABLET, FILM COATED ORAL at 10:59

## 2022-06-20 RX ADMIN — ISOSORBIDE MONONITRATE 30 MILLIGRAM(S): 60 TABLET, EXTENDED RELEASE ORAL at 18:19

## 2022-06-20 RX ADMIN — SODIUM CHLORIDE 190 MILLILITER(S): 9 INJECTION INTRAMUSCULAR; INTRAVENOUS; SUBCUTANEOUS at 14:30

## 2022-06-20 RX ADMIN — Medication 3 MILLIGRAM(S): at 23:07

## 2022-06-20 RX ADMIN — Medication 650 MILLIGRAM(S): at 23:35

## 2022-06-20 RX ADMIN — ATORVASTATIN CALCIUM 80 MILLIGRAM(S): 80 TABLET, FILM COATED ORAL at 23:08

## 2022-06-20 RX ADMIN — Medication 4: at 18:22

## 2022-06-20 NOTE — CONSULT NOTE ADULT - NS ATTEND AMEND GEN_ALL_CORE FT
Pt seen on rounds this afternoon.  77-yo woman with history of HBP, CAD (s/p PCI/stent) and hypothyroidism (now euthyroid off medication) underwent cardiac cath today for symptom of increasing BROWN plus echo with new wall motion abnormalities.  Cath apparently showed patent stent, with no new critical lesions in the any of the major arteries.  Endo consult requested for recommendations regarding management of newly diagnosed DM.  Glucose was 297 this morning.  Admits to recent polydipsia, ?? polyuria.  Basic diet is not excessive in carbs, but she has been drinking a lot of juice lately, and her intake of baked goods has increased.  Describes a "sugar craving" which she says is new.  Glucoses so far today have decreased to 159/215  Pt is motivated to make the necessary modifications in her diet.    --Although we will probably discharge on oral agents (metformin and Januvia), will need to start insulin in the hospital.  Will start Lantus 12 units hs,. premeal lispro 4 units  --Will have the diabetes educator see pt tomorrow to teach FS monitoring, etc

## 2022-06-20 NOTE — DISCHARGE NOTE PROVIDER - HOSPITAL COURSE
INCOMPLETE     78 y/o female w/ PMHx HTN, CAD (s/p prior PCI's to mid LAD and D1 in 2016), Hypothyroidism (previously on Synthroid, no longer taking), Polymyalgia Rheumatica (on daily Methylprednisolone), DM (A1c 9.5% diagnosed in 05/2022) and recent admission to [facility] from 05/20/2022 to 05/21/2022 for Hypertensive Urgency and BROWN. Patient has currently reported severe reduction in exercise tolerance, stating she typically could walk miles and now experiences BROWN w/ ambulation of approximately 1 city block. Echocardiogram (05/20/2022) showed mild to moderate LV hypertrophy, EF 40%, grade I diastolic dysfunction, hypokinesis of basal anterolateral, basal inferolateral, mid inferolateral, and apical inferior walls, mild MR, and mild TR. As compared to prior Echocardiogram from 11/2019, wall motion abnormalities and reduced EF were new. In light of patient's risk factors, CCS Class III anginal equivalent symptoms, and abnormal Echocardiogram results, patient presented for cardiac catheterization w/ possible intervention if clinically indicated. Patient is now s/p diagnostic cardiac catheterization w/ LM normal, proximal LAD 30% followed by patent stent, mid LAD patent stent, ostial D1 30% (jailed) followed by patent stent, LCx/OM1 mild disease, mid RCA 40% mild disease in remaining vessels; Right heart catheterization w/ RA 4, RV 29/-3/6, PA 28/3/12, PCWP 5, CO/CI 3.8/2.33. Right groin access used, Perclose arterial closure device utilized, and venous sheath manual pulled w/ manual pressure held post-procedure. Patient did not have an escort home, thus was unable to be discharge same day as procedure due to sedation. Of note, patient's A1c was 10.5 and Endocrinology consulted, recommending ________ on discharge.     Patient was seen and examined at bedside on _________ and __________. ________ access site remained stable. Labs were reviewed. Repeat EKG was w/o acute ischemic changes and no significant events were noted overnight on telemetry. Patient has now been medically cleared for discharge as per  ________. Patient was given appropriate discharge instructions including medication regimen, access site management, and follow up. Any prescriptions the patient requires refills on upon discharge have been e-prescribed to patient's preferred pharmacy.     VS Stable  Gen: NAD, A&O x3  Cards: RRR, clear S1 and S2 without murmur  Pulm: CTA B/L without w/r/r  Right Groin: No hematoma or ooze, peripheral pulses 2+ B/L  Abd: soft, NT  Ext: no LE edema or ulcerations B/L 77 y/oF, PMHX HTN, CAD (s/p prior PCI's to mid LAD and D1 in 2016), Hypothyroidism (previously on Synthroid, no longer taking), Polymyalgia Rheumatica (on daily Methylprednisolone), DM (A1c 9.5% diagnosed in 05/2022) and recent admission to [facility] from 05/20/2022 to 05/21/2022 for Hypertensive Urgency and BROWN. Patient has currently reported severe reduction in exercise tolerance, stating she typically could walk miles and now experiences BROWN w/ ambulation of approximately 1 city block. Echocardiogram (05/20/2022) showed mild to moderate LV hypertrophy, EF 40%, grade I diastolic dysfunction, hypokinesis of basal anterolateral, basal inferolateral, mid inferolateral, and apical inferior walls, mild MR, and mild TR. As compared to prior Echocardiogram from 11/2019, wall motion abnormalities and reduced EF were new. In light of patient's risk factors, CCS Class III anginal equivalent symptoms, and abnormal Echocardiogram results, patient presented for cardiac catheterization w/ possible intervention if clinically indicated.     Patient is now s/p diagnostic cardiac catheterization w/ LM normal, proximal LAD 30% followed by patent stent, mid LAD patent stent, ostial D1 30% (jailed) followed by patent stent, LCx/OM1 mild disease, mid RCA 40% mild disease in remaining vessels; Right heart catheterization w/ RA 4, RV 29/-3/6, PA 28/3/12, PCWP 5, CO/CI 3.8/2.33. Right groin access used, Perclose arterial closure device utilized, and venous sheath manual pulled w/ manual pressure held post-procedure. Patient did not have an escort home, thus was unable to be discharge same day as procedure due to sedation. Of note, patient's A1c was 10.5 and Endocrinology consulted, recommending ________ on discharge.     Patient was seen and examined at bedside on _________ and __________. ________ access site remained stable. Labs were reviewed. Repeat EKG was w/o acute ischemic changes and no significant events were noted overnight on telemetry. Patient has now been medically cleared for discharge as per  ________. Patient was given appropriate discharge instructions including medication regimen, access site management, and follow up. Any prescriptions the patient requires refills on upon discharge have been e-prescribed to patient's preferred pharmacy.      77 y/oF, PMHX HTN, CAD (s/p prior PCI's to mid LAD and D1 in 2016), Hypothyroidism (previously on Synthroid, no longer taking), Polymyalgia Rheumatica (on daily Methylprednisolone), DM (A1c 9.5% diagnosed in 05/2022) and recent admission to [facility] from 05/20/2022 to 05/21/2022 for Hypertensive Urgency and BROWN. Patient has currently reported severe reduction in exercise tolerance, stating she typically could walk miles and now experiences BROWN w/ ambulation of approximately 1 city block. Echocardiogram (05/20/2022) showed mild to moderate LV hypertrophy, EF 40%, grade I diastolic dysfunction, hypokinesis of basal anterolateral, basal inferolateral, mid inferolateral, and apical inferior walls, mild MR, and mild TR. As compared to prior Echocardiogram from 11/2019, wall motion abnormalities and reduced EF were new. In light of patient's risk factors, CCS Class III anginal equivalent symptoms, and abnormal Echocardiogram results, patient presented for cardiac catheterization w/ possible intervention if clinically indicated.     Patient is now s/p diagnostic cardiac catheterization w/ LM normal, proximal LAD 30% followed by patent stent, mid LAD patent stent, ostial D1 30% (jailed) followed by patent stent, LCx/OM1 mild disease, mid RCA 40% mild disease in remaining vessels; Right heart catheterization w/ RA 4, RV 29/-3/6, PA 28/3/12, PCWP 5, CO/CI 3.8/2.33. Right groin access used, Perclose arterial closure device utilized, and venous sheath manual pulled w/ manual pressure held post-procedure. Patient did not have an escort home, thus was unable to be discharge same day as procedure due to sedation. Of note, patient's A1c was 10.5 and Endocrinology consulted, recommending _Mtformon discharge.     Patient was seen and examined at bedside on _________ and __________. ________ access site remained stable. Labs were reviewed. Repeat EKG was w/o acute ischemic changes and no significant events were noted overnight on telemetry. Patient has now been medically cleared for discharge as per  ________. Patient was given appropriate discharge instructions including medication regimen, access site management, and follow up. Any prescriptions the patient requires refills on upon discharge have been e-prescribed to patient's preferred pharmacy.      77 y/oF, PMHX HTN, CAD (s/p prior PCI's to mid LAD and D1 in 2016), Hypothyroidism (previously on Synthroid, no longer taking), Polymyalgia Rheumatica (on daily Methylprednisolone), DM (A1c 9.5% diagnosed in 05/2022) and recent admission to [facility] from 05/20/2022 to 05/21/2022 for Hypertensive Urgency and BROWN. Patient has currently reported severe reduction in exercise tolerance, stating she typically could walk miles and now experiences BROWN w/ ambulation of approximately 1 city block. Echocardiogram (05/20/2022) showed mild to moderate LV hypertrophy, EF 40%, grade I diastolic dysfunction, hypokinesis of basal anterolateral, basal inferolateral, mid inferolateral, and apical inferior walls, mild MR, and mild TR. As compared to prior Echocardiogram from 11/2019, wall motion abnormalities and reduced EF were new. In light of patient's risk factors, CCS Class III anginal equivalent symptoms, and abnormal Echocardiogram results, patient presented for cardiac catheterization w/ possible intervention if clinically indicated.     Patient is now s/p diagnostic cardiac catheterization w/ LM normal, proximal LAD 30% followed by patent stent, mid LAD patent stent, ostial D1 30% (jailed) followed by patent stent, LCx/OM1 mild disease, mid RCA 40% mild disease in remaining vessels; Right heart catheterization w/ RA 4, RV 29/-3/6, PA 28/3/12, PCWP 5, CO/CI 3.8/2.33. Right groin access used, Perclose arterial closure device utilized, and venous sheath manual pulled w/ manual pressure held post-procedure. Patient did not have an escort home, thus was unable to be discharge same day as procedure due to sedation. Of note, patient's A1c was 10.5 and Endocrinology consulted, recommending   Metformin 500mg BID and Januvia 100mg QD.  Patient was also seen by Diabetes Educator for which she was given instructions- verbal and demonstrated use of how to use glucometer.      Pt seen and examined at bedside this morning. Pt comfortable, denies any CP, SOB, dizziness, or palpitations. VSS, right groin access sites stable, no bleeding or hematoma noted, pulse 2 + b/l. AM labs stable.  Patient has been given appropriate discharge instructions including medication regimen, access site management and follow up. Prescriptions have been e-prescribed to patient's preferred pharmacy. Follow up with Dr. Hsieh within 2 weeks of discharge.     DC Meds:       ASA 81mg  Atorvastatin 80mg  Imdur 30mg  Januvia 100mg  Metformin 500mg BID - START 6/23/22  Toprol XL 25mg  Verapamil 180mg

## 2022-06-20 NOTE — DISCHARGE NOTE PROVIDER - PROVIDER TOKENS
PROVIDER:[TOKEN:[[id number]],FOLLOWUP:[2 weeks]] PROVIDER:[TOKEN:[[id number]],FOLLOWUP:[2 weeks]],FREE:[LAST:[Jose],PHONE:[phone]],FAX:[(   )    -],ADDRESS:[[address] [place] [address]],FOLLOWUP:[1 month]]

## 2022-06-20 NOTE — DISCHARGE NOTE PROVIDER - NSDCCPCAREPLAN_GEN_ALL_CORE_FT
PRINCIPAL DISCHARGE DIAGNOSIS  Diagnosis: CAD (coronary artery disease)  Assessment and Plan of Treatment: - You have a known history of coronary artery disease with stents. You came to the hospital for a planned cardiac cath and NO intervention was performed as your stents remained PATENT/OPEN and only mild disease was seen in the remaining arteries.  - NEVER MISS A DOSE OF ASPIRIN. IF YOU DO, YOU ARE AT RISK OF YOUR STENTS CLOSING AND HAVING A HEART ATTACK. DO NOT STOP THESE TWO MEDICATIONS UNLESS INSTRUCTED TO DO SO BY YOUR CARDIOLOGIST.   - Do NOT drive or operate hazardous machinery for 24 hours. Limit your physical activity for 24-48 hours. Do NOT engage in sports, heavy work or heavy lifting for 72 hours.   - You MAY shower BUT no TUB BATHS, HOT TUBS OR SWIMMING FOR 5 DAYS  - Your procedure was done through your right groin. If you observe flank bleeding from the puncture site, it is an emergency. Please put direct pressure on the site and go directly to the ER. Bleeding under the skin may also occur and a small "black and blue" may be expected. If the area appears to be expanding or swelling around the puncture site, apply manual compression and go immediately to the nearest ER. If your foot/leg becomes cool or blue and/or you are unable to move it, this must be treated as an emergency, go directly to the nearest ER. Look for signs of infection in the groin: fever, red streaking of the leg, obvious pus formation and pain.      SECONDARY DISCHARGE DIAGNOSES  Diagnosis: Type 2 diabetes mellitus  Assessment and Plan of Treatment: - Diabetes is a disorder that disrupts the way your body uses sugar.  Hemoglobin A1C is a blood test that shows what your average blood sugar level has been for the past 2 to 3 months. If your A1C is 6.5 or higher, it probably means you have diabetes, but you should have the test done again to be sure. Your hgba1c was found to be 10.5, which means that YOU HAVE diabetes.   - You were seen by the endocrinology team while you were in the hospital. As discussed     PRINCIPAL DISCHARGE DIAGNOSIS  Diagnosis: CAD (coronary artery disease)  Assessment and Plan of Treatment: - You have a known history of coronary artery disease with stents. You came to the hospital for a planned cardiac cath and NO intervention was performed as your stents remained PATENT/OPEN and only mild disease was seen in the remaining arteries.  - NEVER MISS A DOSE OF ASPIRIN. IF YOU DO, YOU ARE AT RISK OF YOUR STENTS CLOSING AND HAVING A HEART ATTACK. DO NOT STOP THESE TWO MEDICATIONS UNLESS INSTRUCTED TO DO SO BY YOUR CARDIOLOGIST.   - Do NOT drive or operate hazardous machinery for 24 hours. Limit your physical activity for 24-48 hours. Do NOT engage in sports, heavy work or heavy lifting for 72 hours.   - You MAY shower BUT no TUB BATHS, HOT TUBS OR SWIMMING FOR 5 DAYS  - Your procedure was done through your right groin. If you observe flank bleeding from the puncture site, it is an emergency. Please put direct pressure on the site and go directly to the ER. Bleeding under the skin may also occur and a small "black and blue" may be expected. If the area appears to be expanding or swelling around the puncture site, apply manual compression and go immediately to the nearest ER. If your foot/leg becomes cool or blue and/or you are unable to move it, this must be treated as an emergency, go directly to the nearest ER. Look for signs of infection in the groin: fever, red streaking of the leg, obvious pus formation and pain.      SECONDARY DISCHARGE DIAGNOSES  Diagnosis: Type 2 diabetes mellitus  Assessment and Plan of Treatment: - Diabetes is a disorder that disrupts the way your body uses sugar.  Hemoglobin A1C is a blood test that shows what your average blood sugar level has been for the past 2 to 3 months. If your A1C is 6.5 or higher, it probably means you have diabetes, but you should have the test done again to be sure. Your hgba1c was found to be 10.5, which means that YOU HAVE diabetes.   - You were seen by the endocrinology team while you were in the hospital and dibetic educator Stephanie. You have her cell number should you have any immediate questions and are to call to schedule an apt to follow up with [facility] Endocrine team within a month of discharge from the hospital.  - You are being started on Metformin. As you received contrast intravenously (through your IV) it can affect your kidney function and an accumulation of metformin can happen leading to HYPOGLYCEMIA. For this reason you are to HOLD metformin for 48 hours and you may START IT ON 6/23/22.

## 2022-06-20 NOTE — PATIENT PROFILE ADULT - FALL HARM RISK - HARM RISK INTERVENTIONS

## 2022-06-20 NOTE — CONSULT NOTE ADULT - ASSESSMENT
78 y/o F, PMHx HTN, CAD (s/p PCI mLAD and PCI D1 in 2016), hypothyroidism (previously on synthroid, no longer taking), polymyalgia rheumatica (on daily methylprednisolone), DM (A1c 9.5%, 5/2022), recently admitted at [facility] from 5/20-5/21/22 for hypertensive urgency and BROWN. Reports SEVERE reduction in exercise tolerance, typically walks miles; however, now with BROWN on ambulation of ~1 city block.  In light of pt's risk factors, known CAD, abnormal TTE, newly reduced EF, CCS Anginal Equivalent Class III Sx, pt referred for right and left cardiac catheterization with possible intervention, also with hyperglycemia    DM - type 2 - new  A1C: 10.5%  Weight: 64kg BMI 25  Cr/GRF: nl  Ef: 40%

## 2022-06-20 NOTE — CONSULT NOTE ADULT - SUBJECTIVE AND OBJECTIVE BOX
HPI:  78 y/o F, PMHx HTN, CAD (s/p PCI mLAD and PCI D1 in 2016), hypothyroidism (previously on synthroid, no longer taking), polymyalgia rheumatica (on daily methylprednisolone), DM (A1c 9.5%, 5/2022), recently admitted at [facility] from 5/20-5/21/22 for hypertensive urgency and BROWN. Reports SEVERE reduction in exercise tolerance, typically walks miles; however, now with BROWN on ambulation of ~1 city block.  TTE (5/20/22): mild to moderate LVH, EF 40%, grade I diastolic dysfunction, basal anterolateral, basal inferolateral, mid inferolateral and apical inferior hypokinesis, mild MR, mild TR; compared to TTE from 11/2019, basal inferolateral, mid inferolateral, and apical inferior wall hypokinesis is new. In light of pt's risk factors, known CAD, abnormal TTE, newly reduced EF, CCS Anginal Equivalent Class III Sx, pt referred for right and left cardiac catheterization with possible intervention.       FSG & insulin:    Dinner FSG   Lispro   +    Ate  Bedtime FSG     Lantus      and Lispro         Breakfast FSG   Lispro    +    Ate  Lunch FSG      Lispro    +    Ate      Age at Dx:  How dx:  Hx and duration of insulin:  Current Therapy:  Hx of other regimens:    Home FSG:  Fasting  Lunch  Dinner  Bed    Diet:  Exercise:    Hx of hypoglycemia:  Hx of DKA/HHS:  Complications:  Outpatient Endo:    FH:  DM:  Thyroid:  Autoimmune:  Other:    SH:  Smoking  Etoh:  Recreational Drugs:  Social Life:    PMH & Surgical Hx:I25.10    Family history of myocardial infarction (Father)    Handoff    HTN (hypertension)    Hypothyroid    Hyperlipidemia    Stented coronary artery    Coronary artery disease involving native coronary artery of native heart without angina pectoris    No significant past surgical history    Appendicitis    History of cardiac cath            Current Meds:  chlorhexidine 4% Liquid 1 Application(s) Topical once  potassium chloride   Powder 40 milliEquivalent(s) Oral Once  sodium chloride 0.9%. 500 milliLiter(s) IV Continuous <Continuous>      Allergies:  No Known Drug Allergies  Tar (Unknown)      ROS:  Denies the following except as indicated.    General: weight loss/weight gain, decreased appetite, fatigue  Eyes: Blurry vision, double vision, visual changes  ENT: Throat pain, changes in voice,   CV: palpitations, SOB, CP, cough  GI: NVD, difficulty swallowing, abdominal pain  : polyuria, dysuria  Endo: abnormal menses, temperature intolerance, decreased libido  MSK: weakness, joint pain  Skin: rash, dryness, diaphoresis  Heme: Easy bruising, bleeding  Neuro: HA, dizziness, lightheadedness, numbness/ tingling  Psych: Anxiety, Depression    Vital Signs Last 24 Hrs  T(C): --  T(F): --  HR: --  BP: --  BP(mean): --  RR: --  SpO2: --  Height (cm): 160 (06-20 @ 11:04)  Weight (kg): 63.5 (06-20 @ 11:04)  BMI (kg/m2): 24.8 (06-20 @ 11:04)      Constitutional: wn/wd in NAD.   HEENT: NCAT, MMM, OP clear, EOMI, , no proptosis or lid retraction  Neck: no thyromegaly or palpable thyroid nodules   Respiratory: lungs CTAB.  Cardiovascular: regular rhythm, normal S1 and S2, no audible murmurs, no peripheral edema  GI: soft, NT/ND, no masses/HSM appreciated.  Neurology: no tremors, DTR 2+  Skin: no visible rashes/lesions. no acanthosis nigricans. no hyperlipotrophy. no cushing's stigmata.  Psychiatric: AAO x 3, normal affect/mood.  Ext: radial pulses intact, DP pulses intact, extremities warm, no cyanosis, clubbing or edema.       LABS:                        14.1   27.31 )-----------( 330      ( 20 Jun 2022 09:45 )             43.9     06-20    139  |  103  |  23  ----------------------------<  297<H>  3.7   |  20<L>  |  0.87    Ca    9.3      20 Jun 2022 10:15    TPro  6.8  /  Alb  4.3  /  TBili  0.6  /  DBili  x   /  AST  20  /  ALT  27  /  AlkPhos  154<H>  06-20    PT/INR - ( 20 Jun 2022 09:45 )   PT: 10.7 sec;   INR: 0.90          PTT - ( 20 Jun 2022 09:45 )  PTT:23.5 sec      Thyroid Stimulating Hormone, Serum: 1.300 (05-21 @ 06:01)      RADIOLOGY & ADDITIONAL STUDIES:  CAPILLARY BLOOD GLUCOSE            A/P:77y Female    1.  DM -   A1C:  Weight:  Cr/GRF:  ER:    Please continue lantus       units at night / morning.  Please continue lispro      units before each meal.  Please continue lispro moderate / low dose sliding scale four times daily with meals and at bedtime    Pt's fingerstick glucose goal is     Will continue to monitor     For discharge, pt can continue    Pt can follow up at discharge with [facility] by calling  to make an appointment.   Will discuss case with     and update primary team   HPI:  78 y/o F, PMHx HTN, CAD (s/p PCI mLAD and PCI D1 in 2016), hypothyroidism (previously on synthroid, no longer taking), polymyalgia rheumatica (on daily methylprednisolone), DM (A1c 9.5%, 5/2022), recently admitted at [facility] from 5/20-5/21/22 for hypertensive urgency and BROWN. Reports SEVERE reduction in exercise tolerance, typically walks miles; however, now with BROWN on ambulation of ~1 city block.  TTE (5/20/22): mild to moderate LVH, EF 40%, grade I diastolic dysfunction, basal anterolateral, basal inferolateral, mid inferolateral and apical inferior hypokinesis, mild MR, mild TR; compared to TTE from 11/2019, basal inferolateral, mid inferolateral, and apical inferior wall hypokinesis is new. In light of pt's risk factors, known CAD, abnormal TTE, newly reduced EF, CCS Anginal Equivalent Class III Sx, pt referred for right and left cardiac catheterization, diagnostic.    Endocrine consulted for new T2DM diagnosis. Per pt, she had elevated glucose on recent labs that hadn't been addressed yet due issues with higher priority.  A1C 10.5%.  FHx - dm - maybe grandparent, but not parents or siblings.  Reports polyuria polydipsia. Weight stable. Has been drinking a lot of juice to quench thirst.  Diet- rye toast, butter, cheese. Lunch - leftovers or pb/jelly swich or chicken salad. Dinner - casserole and quiche.  Does like cakes and cookies.  On methylprednisolone 4mg daily for rheumatic polymyalgia.  History of hypothyroidism treated with levothyroxine, but has been off for may years. TSH 1.3.      FSG & insulin:  6/20:  10AM . Nos insulin  2PM .  Dinner . Lispro 4    PMH & Surgical HxL:  Family history of myocardial infarction (Father)  HTN (hypertension)  Hypothyroid  Hyperlipidemia  Stented coronary artery  Appendicitis    Current Meds:  chlorhexidine 4% Liquid 1 Application(s) Topical once  potassium chloride   Powder 40 milliEquivalent(s) Oral Once  sodium chloride 0.9%. 500 milliLiter(s) IV Continuous <Continuous>      Allergies:  No Known Drug Allergies  Tar (Unknown)      ROS:  Denies the following except as indicated.    General: weight loss/weight gain, decreased appetite, fatigue  Eyes: Blurry vision, double vision, visual changes  ENT: Throat pain, changes in voice,   CV: palpitations, SOB, CP, cough  GI: NVD, difficulty swallowing, abdominal pain  :  dysuria  Endo: , temperature intolerance  MSK: weakness, joint pain  Skin: rash, dryness, diaphoresis  Heme: Easy bruising, bleeding  Neuro: HA, dizziness, lightheadedness, numbness/ tingling  Psych: Anxiety, Depression    Vital Signs Last 24 Hrs  T(C): --  T(F): --  HR: --  BP: --  BP(mean): --  RR: --  SpO2: --  Height (cm): 160 (06-20 @ 11:04)  Weight (kg): 63.5 (06-20 @ 11:04)  BMI (kg/m2): 24.8 (06-20 @ 11:04)      Constitutional:  NAD.   HEENT: NCAT, MMM, OP clear, EOMI, , no proptosis or lid retraction  Neck: no thyromegaly or palpable thyroid nodules   Respiratory: lungs CTAB.  Cardiovascular: regular rhythm, normal S1 and S2, no audible murmurs, no peripheral edema  GI: soft, NT/ND, no masses/HSM appreciated.  Neurology: no tremors, DTR 2+  Skin: no visible rashes/lesions.   Psychiatric: AAO x 3, normal affect/mood.  Ext: radial pulses intact, DP pulses intact, extremities warm, no cyanosis, clubbing or edema.       LABS:                        14.1   27.31 )-----------( 330      ( 20 Jun 2022 09:45 )             43.9     06-20    139  |  103  |  23  ----------------------------<  297<H>  3.7   |  20<L>  |  0.87    Ca    9.3      20 Jun 2022 10:15    TPro  6.8  /  Alb  4.3  /  TBili  0.6  /  DBili  x   /  AST  20  /  ALT  27  /  AlkPhos  154<H>  06-20    PT/INR - ( 20 Jun 2022 09:45 )   PT: 10.7 sec;   INR: 0.90          PTT - ( 20 Jun 2022 09:45 )  PTT:23.5 sec      Thyroid Stimulating Hormone, Serum: 1.300 (05-21 @ 06:01)

## 2022-06-20 NOTE — CONSULT NOTE ADULT - PROBLEM SELECTOR RECOMMENDATION 9
Please start lantus    12   units at night.  Please start lispro   4   units before each meal.  Please continue lispro moderate dose sliding scale four times daily with meals and at bedtime    Pt's fingerstick glucose goal is 100-180.    Will continue to monitor     For discharge: metformin ER 500mg BID and januvia 100mg daily. Please send glucometer and supplies to [facility].  ELISA and RD consulted.    Pt can follow up at discharge with [facility] by calling  to make an appointment.   Will discuss case with Dr. Velarde   and update primary team

## 2022-06-20 NOTE — DISCHARGE NOTE PROVIDER - CARE PROVIDER_API CALL
Marshall Hsieh (MD)  Cardiovascular Disease; Interventional Cardiology  [address] [facility], [address] [place] [address]  Phone: [phone]  Fax: [phone]  Follow Up Time: 2 weeks   Marshall Hsieh (MD)  Cardiovascular Disease; Interventional Cardiology  [address] [facility], [address]  [place] [address]  Phone: [phone]  Fax: [phone]  Follow Up Time: 2 weeks    Aaron Garcia[address] [place] [address]  Phone: [phone]  Fax: (   )    -  Follow Up Time: 1 month

## 2022-06-20 NOTE — DISCHARGE NOTE PROVIDER - NSDCMRMEDTOKEN_GEN_ALL_CORE_FT
amitriptyline 25 mg oral tablet: 1 tab(s) orally once a day (at bedtime)  isosorbide mononitrate 30 mg oral tablet, extended release: 1 tab(s) orally once a day (in the morning)  Lipitor 80 mg oral tablet: 1 tab(s) orally once a day  methylPREDNISolone 4 mg oral tablet: 1 tab(s) orally once a day  Toprol-XL 25 mg oral tablet, extended release: 1 tab(s) orally once a day  Trintellix 5 mg oral tablet: 1 tab(s) orally once a day  verapamil 180 mg/24 hours oral capsule, extended release: 1 cap(s) orally once a day   alcohol swabs : Apply topically to affected area 4 times a day   amitriptyline 25 mg oral tablet: 1 tab(s) orally once a day (at bedtime)  glucometer (per patient&#x27;s insurance): Test blood sugars four times a day. Dispense #1 glucometer.  isosorbide mononitrate 30 mg oral tablet, extended release: 1 tab(s) orally once a day (in the morning)  lancets: 1 application subcutaneously 4 times a day   Lancing Device:   Lipitor 80 mg oral tablet: 1 tab(s) orally once a day  methylPREDNISolone 4 mg oral tablet: 1 tab(s) orally once a day  Toprol-XL 25 mg oral tablet, extended release: 1 tab(s) orally once a day  Trintellix 5 mg oral tablet: 1 tab(s) orally once a day  verapamil 180 mg/24 hours oral capsule, extended release: 1 cap(s) orally once a day   alcohol swabs : Apply topically to affected area 4 times a day   amitriptyline 25 mg oral tablet: 1 tab(s) orally once a day (at bedtime)  glucometer (per patient&#x27;s insurance): Test blood sugars four times a day. Dispense #1 glucometer.  isosorbide mononitrate 30 mg oral tablet, extended release: 1 tab(s) orally once a day (in the morning)  lancets: 1 application subcutaneously 4 times a day   Lancing Device:   Lipitor 80 mg oral tablet: 1 tab(s) orally once a day  methylPREDNISolone 4 mg oral tablet: 1 tab(s) orally once a day  test strips (per patient&#x27;s insurance): 1 application subcutaneously 4 times a day. ** Compatible with patient&#x27;s glucometer **  Toprol-XL 25 mg oral tablet, extended release: 1 tab(s) orally once a day  Trintellix 5 mg oral tablet: 1 tab(s) orally once a day  verapamil 180 mg/24 hours oral capsule, extended release: 1 cap(s) orally once a day   alcohol swabs : Apply topically to affected area 4 times a day   amitriptyline 25 mg oral tablet: 1 tab(s) orally once a day (at bedtime)  aspirin 81 mg oral delayed release tablet: 1 tab(s) orally once a day  glucometer (per patient&#x27;s insurance): Test blood sugars four times a day. Dispense #1 glucometer.  isosorbide mononitrate 30 mg oral tablet, extended release: 1 tab(s) orally once a day (in the morning)  Januvia 100 mg oral tablet: 1 tab(s) orally once a day   lancets: 1 application subcutaneously 4 times a day   Lancing Device:   Lipitor 80 mg oral tablet: 1 tab(s) orally once a day  metFORMIN 500 mg oral tablet, extended release: 1 tab(s) orally 2 times a day . START 6/23/22  methylPREDNISolone 4 mg oral tablet: 1 tab(s) orally once a day  test strips (per patient&#x27;s insurance): 1 application subcutaneously 4 times a day. ** Compatible with patient&#x27;s glucometer **  Toprol-XL 25 mg oral tablet, extended release: 1 tab(s) orally once a day  Trintellix 5 mg oral tablet: 1 tab(s) orally once a day  verapamil 180 mg/24 hours oral capsule, extended release: 1 cap(s) orally once a day

## 2022-06-21 ENCOUNTER — TRANSCRIPTION ENCOUNTER (OUTPATIENT)
Age: 77
End: 2022-06-21

## 2022-06-21 VITALS — RESPIRATION RATE: 18 BRPM | SYSTOLIC BLOOD PRESSURE: 131 MMHG | DIASTOLIC BLOOD PRESSURE: 83 MMHG

## 2022-06-21 LAB
ANION GAP SERPL CALC-SCNC: 9 MMOL/L — SIGNIFICANT CHANGE UP (ref 5–17)
BLD GP AB SCN SERPL QL: NEGATIVE — SIGNIFICANT CHANGE UP
BUN SERPL-MCNC: 19 MG/DL — SIGNIFICANT CHANGE UP (ref 7–23)
CALCIUM SERPL-MCNC: 8.6 MG/DL — SIGNIFICANT CHANGE UP (ref 8.4–10.5)
CHLORIDE SERPL-SCNC: 107 MMOL/L — SIGNIFICANT CHANGE UP (ref 96–108)
CO2 SERPL-SCNC: 20 MMOL/L — LOW (ref 22–31)
CREAT SERPL-MCNC: 0.69 MG/DL — SIGNIFICANT CHANGE UP (ref 0.5–1.3)
EGFR: 90 ML/MIN/1.73M2 — SIGNIFICANT CHANGE UP
GLUCOSE BLDC GLUCOMTR-MCNC: 165 MG/DL — HIGH (ref 70–99)
GLUCOSE BLDC GLUCOMTR-MCNC: 230 MG/DL — HIGH (ref 70–99)
GLUCOSE SERPL-MCNC: 149 MG/DL — HIGH (ref 70–99)
HCT VFR BLD CALC: 33.9 % — LOW (ref 34.5–45)
HCT VFR BLD CALC: 35.6 % — SIGNIFICANT CHANGE UP (ref 34.5–45)
HGB BLD-MCNC: 11 G/DL — LOW (ref 11.5–15.5)
HGB BLD-MCNC: 11.4 G/DL — LOW (ref 11.5–15.5)
MAGNESIUM SERPL-MCNC: 1.8 MG/DL — SIGNIFICANT CHANGE UP (ref 1.6–2.6)
MCHC RBC-ENTMCNC: 31.9 PG — SIGNIFICANT CHANGE UP (ref 27–34)
MCHC RBC-ENTMCNC: 32 GM/DL — SIGNIFICANT CHANGE UP (ref 32–36)
MCHC RBC-ENTMCNC: 32.4 GM/DL — SIGNIFICANT CHANGE UP (ref 32–36)
MCHC RBC-ENTMCNC: 32.5 PG — SIGNIFICANT CHANGE UP (ref 27–34)
MCV RBC AUTO: 100.3 FL — HIGH (ref 80–100)
MCV RBC AUTO: 99.7 FL — SIGNIFICANT CHANGE UP (ref 80–100)
NRBC # BLD: 0 /100 WBCS — SIGNIFICANT CHANGE UP (ref 0–0)
NRBC # BLD: 0 /100 WBCS — SIGNIFICANT CHANGE UP (ref 0–0)
PLATELET # BLD AUTO: 271 K/UL — SIGNIFICANT CHANGE UP (ref 150–400)
PLATELET # BLD AUTO: 278 K/UL — SIGNIFICANT CHANGE UP (ref 150–400)
POTASSIUM SERPL-MCNC: 4.3 MMOL/L — SIGNIFICANT CHANGE UP (ref 3.5–5.3)
POTASSIUM SERPL-SCNC: 4.3 MMOL/L — SIGNIFICANT CHANGE UP (ref 3.5–5.3)
RBC # BLD: 3.38 M/UL — LOW (ref 3.8–5.2)
RBC # BLD: 3.57 M/UL — LOW (ref 3.8–5.2)
RBC # FLD: 14.4 % — SIGNIFICANT CHANGE UP (ref 10.3–14.5)
RBC # FLD: 14.4 % — SIGNIFICANT CHANGE UP (ref 10.3–14.5)
RH IG SCN BLD-IMP: POSITIVE — SIGNIFICANT CHANGE UP
RH IG SCN BLD-IMP: POSITIVE — SIGNIFICANT CHANGE UP
SODIUM SERPL-SCNC: 136 MMOL/L — SIGNIFICANT CHANGE UP (ref 135–145)
WBC # BLD: 18.35 K/UL — HIGH (ref 3.8–10.5)
WBC # BLD: 18.95 K/UL — HIGH (ref 3.8–10.5)
WBC # FLD AUTO: 18.35 K/UL — HIGH (ref 3.8–10.5)
WBC # FLD AUTO: 18.95 K/UL — HIGH (ref 3.8–10.5)

## 2022-06-21 PROCEDURE — 82962 GLUCOSE BLOOD TEST: CPT

## 2022-06-21 PROCEDURE — 86900 BLOOD TYPING SEROLOGIC ABO: CPT

## 2022-06-21 PROCEDURE — 85027 COMPLETE CBC AUTOMATED: CPT

## 2022-06-21 PROCEDURE — 36415 COLL VENOUS BLD VENIPUNCTURE: CPT

## 2022-06-21 PROCEDURE — 85610 PROTHROMBIN TIME: CPT

## 2022-06-21 PROCEDURE — 83036 HEMOGLOBIN GLYCOSYLATED A1C: CPT

## 2022-06-21 PROCEDURE — 82550 ASSAY OF CK (CPK): CPT

## 2022-06-21 PROCEDURE — 86901 BLOOD TYPING SEROLOGIC RH(D): CPT

## 2022-06-21 PROCEDURE — 85025 COMPLETE CBC W/AUTO DIFF WBC: CPT

## 2022-06-21 PROCEDURE — 82553 CREATINE MB FRACTION: CPT

## 2022-06-21 PROCEDURE — 80061 LIPID PANEL: CPT

## 2022-06-21 PROCEDURE — 93460 R&L HRT ART/VENTRICLE ANGIO: CPT

## 2022-06-21 PROCEDURE — C1760: CPT

## 2022-06-21 PROCEDURE — 99239 HOSP IP/OBS DSCHRG MGMT >30: CPT

## 2022-06-21 PROCEDURE — 83735 ASSAY OF MAGNESIUM: CPT

## 2022-06-21 PROCEDURE — 80048 BASIC METABOLIC PNL TOTAL CA: CPT

## 2022-06-21 PROCEDURE — C1894: CPT

## 2022-06-21 PROCEDURE — 99231 SBSQ HOSP IP/OBS SF/LOW 25: CPT

## 2022-06-21 PROCEDURE — C1769: CPT

## 2022-06-21 PROCEDURE — 93005 ELECTROCARDIOGRAM TRACING: CPT

## 2022-06-21 PROCEDURE — 85730 THROMBOPLASTIN TIME PARTIAL: CPT

## 2022-06-21 PROCEDURE — 80053 COMPREHEN METABOLIC PANEL: CPT

## 2022-06-21 PROCEDURE — C1887: CPT

## 2022-06-21 PROCEDURE — 86850 RBC ANTIBODY SCREEN: CPT

## 2022-06-21 PROCEDURE — 93010 ELECTROCARDIOGRAM REPORT: CPT

## 2022-06-21 RX ORDER — ASPIRIN/CALCIUM CARB/MAGNESIUM 324 MG
1 TABLET ORAL
Qty: 30 | Refills: 0
Start: 2022-06-21 | End: 2022-07-20

## 2022-06-21 RX ORDER — METFORMIN HYDROCHLORIDE 850 MG/1
1 TABLET ORAL
Qty: 60 | Refills: 1
Start: 2022-06-21 | End: 2022-08-19

## 2022-06-21 RX ORDER — ATORVASTATIN CALCIUM 80 MG/1
1 TABLET, FILM COATED ORAL
Qty: 30 | Refills: 1
Start: 2022-06-21 | End: 2022-08-19

## 2022-06-21 RX ORDER — ATORVASTATIN CALCIUM 80 MG/1
1 TABLET, FILM COATED ORAL
Qty: 0 | Refills: 0 | DISCHARGE

## 2022-06-21 RX ORDER — MAGNESIUM OXIDE 400 MG ORAL TABLET 241.3 MG
400 TABLET ORAL ONCE
Refills: 0 | Status: COMPLETED | OUTPATIENT
Start: 2022-06-21 | End: 2022-06-21

## 2022-06-21 RX ORDER — SITAGLIPTIN 50 MG/1
1 TABLET, FILM COATED ORAL
Qty: 30 | Refills: 1
Start: 2022-06-21 | End: 2022-08-19

## 2022-06-21 RX ADMIN — MAGNESIUM OXIDE 400 MG ORAL TABLET 400 MILLIGRAM(S): 241.3 TABLET ORAL at 12:36

## 2022-06-21 RX ADMIN — Medication 25 MILLIGRAM(S): at 05:36

## 2022-06-21 RX ADMIN — Medication 4 MILLIGRAM(S): at 05:36

## 2022-06-21 RX ADMIN — Medication 4: at 13:09

## 2022-06-21 RX ADMIN — Medication 2: at 06:50

## 2022-06-21 RX ADMIN — ISOSORBIDE MONONITRATE 30 MILLIGRAM(S): 60 TABLET, EXTENDED RELEASE ORAL at 12:37

## 2022-06-21 RX ADMIN — Medication 650 MILLIGRAM(S): at 00:26

## 2022-06-21 RX ADMIN — Medication 81 MILLIGRAM(S): at 12:36

## 2022-06-21 RX ADMIN — Medication 120 MILLIGRAM(S): at 05:36

## 2022-06-21 RX ADMIN — Medication 4 UNIT(S): at 13:09

## 2022-06-21 NOTE — DIETITIAN INITIAL EVALUATION ADULT - OTHER CALCULATIONS
pounds +-10%; %HNU=478  IBW used to calculate energy needs due to pt's current body weight exceeding 120% of IBW  Adjust for age and current conditions; fluids per team d/t reduced EF

## 2022-06-21 NOTE — PROGRESS NOTE ADULT - PROBLEM SELECTOR PLAN 1
Type 2 diabetes mellitus.   ·  Recommendation: Please continue lantus    12   units at night.  Please continue Pt feelispro   4   units before each meal.  Please continue lispro moderate dose sliding scale four times daily with meals and at bedtime    Pt's fingerstick glucose goal is 100-180.    Will continue to monitor     For discharge: metformin ER 500mg BID and januvia 100mg daily. Please send glucometer and supplies to [facility].  ELISA and RD consulted.    Pt can follow up at discharge with [facility] by calling  to make an appointment.   Will discuss case with Dr. Velarde   and update primary team

## 2022-06-21 NOTE — DIETITIAN INITIAL EVALUATION ADULT - NSICDXPASTSURGICALHX_GEN_ALL_CORE_FT
PAST SURGICAL HISTORY:  Appendicitis when pt was 28 yo    [facility] cardiac [facility]: 85% mid Left anterior descending artery FFR 0.76; mid D1 diffuse 90%; LCx luminal irregularities; RCA dominant with luminal irregularities; EF 55% EDP = 15mmHg. Successful Rotoblator assisted PTCA Promus ARIES placement of mid Left anterior descending artery and D1.

## 2022-06-21 NOTE — DIETITIAN INITIAL EVALUATION ADULT - PERSON TAUGHT/METHOD
Diabetes education provided. Discussed CHO examples, role of fiber, plate model. Reviewed healthy eating education. Pt to benefit from more in depth education however seemed to only be able to retain small amounts at this time d/t reports of being overwhelmed. Pt however is willing to learn./verbal instruction/written material/teach back - (Patient repeats in own words)/patient instructed

## 2022-06-21 NOTE — DIETITIAN INITIAL EVALUATION ADULT - ADD RECOMMEND
1. Monitor PO intake/appetite, GI distress, diet tolerance, labs, weights.  2. Honor pt food preferences as able.  3. RD to remain available for additional nutrition interventions as needed.   ** High Nutrition Risk.

## 2022-06-21 NOTE — DISCHARGE NOTE NURSING/CASE MANAGEMENT/SOCIAL WORK - NSDCPEFALRISK_GEN_ALL_CORE
For information on Fall & Injury Prevention, visit: [email or web address] OR  [email or web address] OR  https://www.cdc.gov/steadi/patient.html

## 2022-06-21 NOTE — DISCHARGE NOTE NURSING/CASE MANAGEMENT/SOCIAL WORK - PATIENT PORTAL LINK FT
You can access the FollowMyHealth Patient Portal offered by [facility] by registering at the following website: [email or web address]. By joining [employer]’s FollowMyHealth portal, you will also be able to view your health information using other applications (apps) compatible with our system.

## 2022-06-21 NOTE — PROGRESS NOTE ADULT - SUBJECTIVE AND OBJECTIVE BOX
INTERVAL HPI/OVERNIGHT EVENTS:    Patient seen and examined at the bedside. Feeling well and awaiting discharge[facility] Glucometer supplies delivered to bedside.    FSG & insulin:  Yesterday:  Dinner FSG  215  Lispro 4   Ate chicken and rice.  Bedtime    Lantus 12   lispro   6  Today:  Breakfast FSG  165  Lispro  0+2 Ate    cheerios eggs, potatoes  Lunch FSG  230   Lispro   Ate    Pt reports the following symptoms:    CONSTITUTIONAL:  Negative fever or chills, feels well, good appetite  EYES:  Negative  blurry vision or double vision  CARDIOVASCULAR:  Negative for chest pain or palpitations  RESPIRATORY:  Negative for cough, wheezing, or SOB   GASTROINTESTINAL:  Negative for nausea, vomiting, diarrhea, constipation, or abdominal pain  GENITOURINARY:  Negative frequency, urgency or dysuria  NEUROLOGIC:  No headache, confusion, dizziness, lightheadedness    MEDICATIONS  (STANDING):  amitriptyline 25 milliGRAM(s) Oral at bedtime  aspirin enteric coated 81 milliGRAM(s) Oral daily  atorvastatin 80 milliGRAM(s) Oral at bedtime  chlorhexidine 4% Liquid 1 Application(s) Topical once  dextrose 5%. 1000 milliLiter(s) (50 mL/Hr) IV Continuous <Continuous>  dextrose 5%. 1000 milliLiter(s) (100 mL/Hr) IV Continuous <Continuous>  dextrose 50% Injectable 25 Gram(s) IV Push once  dextrose 50% Injectable 12.5 Gram(s) IV Push once  dextrose 50% Injectable 25 Gram(s) IV Push once  glucagon  Injectable 1 milliGRAM(s) IntraMuscular once  influenza  Vaccine (HIGH DOSE) 0.7 milliLiter(s) IntraMuscular once  insulin glargine Injectable (LANTUS) 12 Unit(s) SubCutaneous at bedtime  insulin lispro (ADMELOG) corrective regimen sliding scale   SubCutaneous Before meals and at bedtime  insulin lispro Injectable (ADMELOG) 4 Unit(s) SubCutaneous three times a day before meals  isosorbide   mononitrate ER Tablet (IMDUR) 30 milliGRAM(s) Oral daily  methylPREDNISolone 4 milliGRAM(s) Oral daily  metoprolol succinate ER 25 milliGRAM(s) Oral daily  sodium chloride 0.9%. 500 milliLiter(s) (190 mL/Hr) IV Continuous <Continuous>  verapamil  milliGRAM(s) Oral daily    MEDICATIONS  (PRN):  acetaminophen     Tablet .. 650 milliGRAM(s) Oral every 6 hours PRN Mild Pain (1 - 3), Moderate Pain (4 - 6)  dextrose Oral Gel 15 Gram(s) Oral once PRN Blood Glucose LESS THAN 70 milliGRAM(s)/deciliter      PHYSICAL EXAM  Vital Signs Last 24 Hrs  T(C): 36.4 (21 Jun 2022 09:42), Max: 36.4 (20 Jun 2022 22:33)  T(F): 97.5 (21 Jun 2022 09:42), Max: 97.6 (20 Jun 2022 22:33)  HR: 70 (21 Jun 2022 08:26) (70 - 98)  BP: 131/83 (21 Jun 2022 12:36) (121/81 - 159/100)  BP(mean): --  RR: 18 (21 Jun 2022 12:36) (17 - 19)  SpO2: 99% (21 Jun 2022 08:26) (95% - 99%)    Constitutional: wn/wd in NAD.   HEENT: NCAT, MMM, OP clear, EOMI, no proptosis or lid retraction  Neck: no thyromegaly or palpable thyroid nodules   Respiratory: lungs CTAB.  Cardiovascular: regular rhythm, normal S1 and S2, no audible murmurs, no peripheral edema  GI: soft, NT/ND, no masses/HSM appreciated.  Neurology: no tremors, DTR 2+  Skin: no visible rashes/lesions. no acanthosis nigricans. no lipohypertrophy. no cushing's stigmata.  Psychiatric: AAO x 3, normal affect/mood.    LABS:                        11.4   18.95 )-----------( 278      ( 21 Jun 2022 07:19 )             35.6     06-21    136  |  107  |  19  ----------------------------<  149<H>  4.3   |  20<L>  |  0.69    Ca    8.6      21 Jun 2022 06:11  Mg     1.8     06-21    TPro  6.8  /  Alb  4.3  /  TBili  0.6  /  DBili  x   /  AST  20  /  ALT  27  /  AlkPhos  154<H>  06-20    PT/INR - ( 20 Jun 2022 09:45 )   PT: 10.7 sec;   INR: 0.90          PTT - ( 20 Jun 2022 09:45 )  PTT:23.5 sec    Thyroid Stimulating Hormone, Serum: 1.300 uIU/mL (05-21 @ 06:01)      HbA1C:   CAPILLARY BLOOD GLUCOSE      POCT Blood Glucose.: 230 mg/dL (21 Jun 2022 12:53)  POCT Blood Glucose.: 165 mg/dL (21 Jun 2022 06:47)  POCT Blood Glucose.: 295 mg/dL (20 Jun 2022 21:52)  POCT Blood Glucose.: 215 mg/dL (20 Jun 2022 17:12)  POCT Blood Glucose.: 155 mg/dL (20 Jun 2022 14:45)

## 2022-06-21 NOTE — ADVANCED PRACTICE NURSE CONSULT - REASON FOR CONSULT
[facility] contacted by PARI Berman ( endocrine team) discharge imminent.    Newly diagnosed Diabetes Mellitus

## 2022-06-21 NOTE — DIETITIAN INITIAL EVALUATION ADULT - PERTINENT LABORATORY DATA
06-21    136  |  107  |  19  ----------------------------<  149<H>  4.3   |  20<L>  |  0.69    Ca    8.6      21 Jun 2022 06:11  Mg     1.8     06-21    TPro  6.8  /  Alb  4.3  /  TBili  0.6  /  DBili  x   /  AST  20  /  ALT  27  /  AlkPhos  154<H>  06-20  POCT Blood Glucose.: 165 mg/dL (06-21-22 @ 06:47)  A1C with Estimated Average Glucose Result: 10.5 % (06-20-22 @ 09:45)  A1C with Estimated Average Glucose Result: 9.5 % (05-21-22 @ 06:01)

## 2022-06-21 NOTE — ADVANCED PRACTICE NURSE CONSULT - RECOMMEDATIONS
See DSME on flow sheets.    Pt lives on [facility] [address] [facility] in [facility] and indicated that she is not willing to attend the [facility]  Please consider referral to Jose   Address: [facility] [address] [facility] [address] [facility]  City: [place]  State: [place]  ZIP: [address]  Phone: [phone]

## 2022-06-21 NOTE — DIETITIAN INITIAL EVALUATION ADULT - PERTINENT MEDS FT
MEDICATIONS  (STANDING):  amitriptyline 25 milliGRAM(s) Oral at bedtime  aspirin enteric coated 81 milliGRAM(s) Oral daily  atorvastatin 80 milliGRAM(s) Oral at bedtime  chlorhexidine 4% Liquid 1 Application(s) Topical once  dextrose 5%. 1000 milliLiter(s) (50 mL/Hr) IV Continuous <Continuous>  dextrose 5%. 1000 milliLiter(s) (100 mL/Hr) IV Continuous <Continuous>  dextrose 50% Injectable 25 Gram(s) IV Push once  dextrose 50% Injectable 12.5 Gram(s) IV Push once  dextrose 50% Injectable 25 Gram(s) IV Push once  glucagon  Injectable 1 milliGRAM(s) IntraMuscular once  influenza  Vaccine (HIGH DOSE) 0.7 milliLiter(s) IntraMuscular once  insulin glargine Injectable (LANTUS) 12 Unit(s) SubCutaneous at bedtime  insulin lispro (ADMELOG) corrective regimen sliding scale   SubCutaneous Before meals and at bedtime  insulin lispro Injectable (ADMELOG) 4 Unit(s) SubCutaneous three times a day before meals  isosorbide   mononitrate ER Tablet (IMDUR) 30 milliGRAM(s) Oral daily  magnesium oxide 400 milliGRAM(s) Oral once  methylPREDNISolone 4 milliGRAM(s) Oral daily  metoprolol succinate ER 25 milliGRAM(s) Oral daily  sodium chloride 0.9%. 500 milliLiter(s) (190 mL/Hr) IV Continuous <Continuous>  verapamil  milliGRAM(s) Oral daily    MEDICATIONS  (PRN):  acetaminophen     Tablet .. 650 milliGRAM(s) Oral every 6 hours PRN Mild Pain (1 - 3), Moderate Pain (4 - 6)  dextrose Oral Gel 15 Gram(s) Oral once PRN Blood Glucose LESS THAN 70 milliGRAM(s)/deciliter

## 2022-06-21 NOTE — PROGRESS NOTE ADULT - ASSESSMENT
76 y/o F, PMHx HTN, CAD (s/p PCI mLAD and PCI D1 in 2016), hypothyroidism (previously on synthroid, no longer taking), polymyalgia rheumatica (on daily methylprednisolone), DM (A1c 9.5%, 5/2022), recently admitted at [facility] from 5/20-5/21/22 for hypertensive urgency and BROWN. Reports SEVERE reduction in exercise tolerance, typically walks miles; however, now with BROWN on ambulation of ~1 city block.  In light of pt's risk factors, known CAD, abnormal TTE, newly reduced EF, CCS Anginal Equivalent Class III Sx, pt referred for right and left cardiac catheterization with possible intervention, also with hyperglycemia    DM - type 2 - new  A1C: 10.5%  Weight: 64kg BMI 25  Cr/GRF: nl  Ef: 40%

## 2022-06-21 NOTE — DIETITIAN INITIAL EVALUATION ADULT - NS FNS DIET ORDER
Diet, DASH/TLC:   Sodium & Cholesterol Restricted  Consistent Carbohydrate {No Snacks} (CSTCHO) (06-20-22 @ 14:11)

## 2022-06-21 NOTE — DIETITIAN INITIAL EVALUATION ADULT - OTHER INFO
76 y/o F, PMHx HTN, CAD (s/p PCI mLAD and PCI D1 in 2016), hypothyroidism (previously on synthroid, no longer taking), polymyalgia rheumatica (on daily methylprednisolone), DM (A1c 9.5%, 5/2022), recently admitted at [facility] from 5/20-5/21/22 for hypertensive urgency and BROWN. Reports SEVERE reduction in exercise tolerance, typically walks miles; however, now with BROWN on ambulation of ~1 city block.  In light of pt's risk factors, known CAD, abnormal TTE, newly reduced EF, CCS Anginal Equivalent Class III Sx, pt referred for right and left cardiac catheterization with possible intervention.     Pt seen on 5UR this AM for DM diet education. Noted DM Hx per H&P, pt reports she was unaware she had DM, if anything may have been told she was pre-DM. Pt reports being seen by many members of the team about her DM since admission and feels over whelmed. RN reports pt seems to be forgetful. ? Pt full medical comprehension on current conditions. Seems to be on a regular diet PTA; likes tuna on white bread, beans, both white/sweet potatoes, pasta, casserole and quiche (makes with low fat milk, three 3 eggs - gets 6 severing from this). Reports often frying food in olive oil. Pt on steroids PTA, per H&P no DM meds. Endo following noting For discharge: metformin ER 500mg BID and januvia 100mg daily. Last 3 POCT 165 295 215, A1c 10.5%. Other labs of Note: CHOL 218, , HDL 50, NON . Groin Pain 5/5. BM+5/19. No pressure ulcers, Carroll 20, 1+L ankle/leg edema.   Please see below for nutritions recommendations. Recs made with team.

## 2022-06-29 DIAGNOSIS — E11.65 TYPE 2 DIABETES MELLITUS WITH HYPERGLYCEMIA: ICD-10-CM

## 2022-06-29 DIAGNOSIS — Z79.52 LONG TERM (CURRENT) USE OF SYSTEMIC STEROIDS: ICD-10-CM

## 2022-06-29 DIAGNOSIS — E11.9 TYPE 2 DIABETES MELLITUS WITHOUT COMPLICATIONS: ICD-10-CM

## 2022-06-29 DIAGNOSIS — E87.6 HYPOKALEMIA: ICD-10-CM

## 2022-06-29 DIAGNOSIS — E03.9 HYPOTHYROIDISM, UNSPECIFIED: ICD-10-CM

## 2022-06-29 DIAGNOSIS — Z95.5 PRESENCE OF CORONARY ANGIOPLASTY IMPLANT AND GRAFT: ICD-10-CM

## 2022-06-29 DIAGNOSIS — M35.3 POLYMYALGIA RHEUMATICA: ICD-10-CM

## 2022-06-29 DIAGNOSIS — I25.110 ATHEROSCLEROTIC HEART DISEASE OF NATIVE CORONARY ARTERY WITH UNSTABLE ANGINA PECTORIS: ICD-10-CM

## 2022-06-29 DIAGNOSIS — Z79.84 LONG TERM (CURRENT) USE OF ORAL HYPOGLYCEMIC DRUGS: ICD-10-CM

## 2022-06-29 DIAGNOSIS — I25.10 ATHEROSCLEROTIC HEART DISEASE OF NATIVE CORONARY ARTERY WITHOUT ANGINA PECTORIS: ICD-10-CM

## 2022-06-29 DIAGNOSIS — Z79.890 HORMONE REPLACEMENT THERAPY: ICD-10-CM

## 2022-09-26 NOTE — H&P ADULT - NSCORESITESY/N_GEN_A_CORE_RD
No
Nausea and vomiting that does not stop/Inability to tolerate liquids or foods/Increased irritability or sluggishness

## 2023-01-19 NOTE — PATIENT PROFILE ADULT - TOBACCO USE
Pt was seen earlier but signing the note late.   agree with fellow's plan above Current every day smoker